# Patient Record
Sex: FEMALE | Race: WHITE | NOT HISPANIC OR LATINO | Employment: OTHER | URBAN - METROPOLITAN AREA
[De-identification: names, ages, dates, MRNs, and addresses within clinical notes are randomized per-mention and may not be internally consistent; named-entity substitution may affect disease eponyms.]

---

## 2017-01-23 PROBLEM — F51.01 PRIMARY INSOMNIA: Status: ACTIVE | Noted: 2017-01-23

## 2017-03-28 PROBLEM — B02.29 POSTHERPETIC NEURALGIA: Status: ACTIVE | Noted: 2017-03-28

## 2017-05-07 PROBLEM — G89.4 CHRONIC PAIN SYNDROME: Status: ACTIVE | Noted: 2017-05-07

## 2017-05-07 PROBLEM — F41.9 ANXIETY: Status: ACTIVE | Noted: 2017-05-07

## 2017-05-07 PROBLEM — E55.9 VITAMIN D DEFICIENCY: Status: ACTIVE | Noted: 2017-05-07

## 2017-05-07 PROBLEM — I10 ESSENTIAL HYPERTENSION: Status: ACTIVE | Noted: 2017-05-07

## 2017-05-07 PROBLEM — Z99.89 USE OF CANE AS AMBULATORY AID: Status: ACTIVE | Noted: 2017-05-07

## 2017-05-07 PROBLEM — E78.00 HYPERCHOLESTEREMIA: Status: ACTIVE | Noted: 2017-05-07

## 2017-11-13 ENCOUNTER — TELEPHONE (OUTPATIENT)
Dept: FAMILY MEDICINE | Facility: CLINIC | Age: 67
End: 2017-11-13

## 2017-11-13 NOTE — TELEPHONE ENCOUNTER
Spoke with pt wanted a doctor closer to her , pt has PTSD,has had several spine surgeries arthritis and much more, pt would like to est care.

## 2017-11-13 NOTE — TELEPHONE ENCOUNTER
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 Rios.

## 2017-11-13 NOTE — TELEPHONE ENCOUNTER
----- Message from Amira Pedro sent at 11/13/2017  9:05 AM CST -----  Contact: Self/ [phone]  New patient would like to speak with you about scheduling a morning appointment to establish care. Please advise.

## 2017-12-05 ENCOUNTER — OFFICE VISIT (OUTPATIENT)
Dept: FAMILY MEDICINE | Facility: CLINIC | Age: 67
End: 2017-12-05
Payer: MEDICARE

## 2017-12-05 VITALS
RESPIRATION RATE: 18 BRPM | WEIGHT: 251.31 LBS | TEMPERATURE: 98 F | BODY MASS INDEX: 41.87 KG/M2 | SYSTOLIC BLOOD PRESSURE: 122 MMHG | DIASTOLIC BLOOD PRESSURE: 68 MMHG | HEART RATE: 70 BPM | OXYGEN SATURATION: 96 % | HEIGHT: 65 IN

## 2017-12-05 DIAGNOSIS — F43.10 PTSD (POST-TRAUMATIC STRESS DISORDER): ICD-10-CM

## 2017-12-05 DIAGNOSIS — H53.9 VISION ABNORMALITIES: ICD-10-CM

## 2017-12-05 DIAGNOSIS — F41.9 ANXIETY: ICD-10-CM

## 2017-12-05 DIAGNOSIS — E78.00 HYPERCHOLESTEREMIA: ICD-10-CM

## 2017-12-05 DIAGNOSIS — I10 ESSENTIAL HYPERTENSION: Primary | ICD-10-CM

## 2017-12-05 DIAGNOSIS — F32.A DEPRESSION, UNSPECIFIED DEPRESSION TYPE: ICD-10-CM

## 2017-12-05 DIAGNOSIS — E55.9 VITAMIN D DEFICIENCY: ICD-10-CM

## 2017-12-05 PROCEDURE — 99214 OFFICE O/P EST MOD 30 MIN: CPT | Mod: S$PBB,,, | Performed by: FAMILY MEDICINE

## 2017-12-05 PROCEDURE — 99215 OFFICE O/P EST HI 40 MIN: CPT | Mod: PBBFAC,PN | Performed by: FAMILY MEDICINE

## 2017-12-05 PROCEDURE — 99999 PR PBB SHADOW E&M-EST. PATIENT-LVL V: CPT | Mod: PBBFAC,,, | Performed by: FAMILY MEDICINE

## 2017-12-05 RX ORDER — SERTRALINE HYDROCHLORIDE 50 MG/1
50 TABLET, FILM COATED ORAL DAILY
Qty: 30 TABLET | Refills: 2 | Status: SHIPPED | OUTPATIENT
Start: 2017-12-05 | End: 2018-01-03 | Stop reason: SDUPTHER

## 2017-12-05 NOTE — PROGRESS NOTES
HPI:  Marie Cano is a 67 y.o. year old female that presents to [facility]. She recently had blood work done at her previous PCP. She has not seen an eye doctor since her Medicare is not taken by any of them. She notice that her hands heat up at night and she involuntary she nohemy for air , even when she is eating.She states tat she has PTSD since [place] and she sleeps in her clothes and wears rubber shoes to make sure they float. This occurs even on the days when it is not raining. She keeps all of her belongings around her when she sleeps just in case there is another storm. She has never been in [facility] for this. She takes cymbalta.  Chief Complaint   Patient presents with    Annual Exam    Establish Care   .     HPI    Past Medical History:   Diagnosis Date    Anxiety 5/7/2017    Arthritis 12/19/2015    Chronic pain syndrome 5/7/2017    Essential hypertension 5/7/2017    Hypercholesteremia 5/7/2017    Left knee pain 12/19/2015    SOB (shortness of breath) 12/19/2015    Spinal stenosis     Use of cane as ambulatory aid 5/7/2017    Vitamin D deficiency 5/7/2017     Social History     Social History    Marital status:      Spouse name: N/A    Number of children: N/A    Years of education: N/A     Occupational History    Not on file.     Social History Main Topics    Smoking status: Never Smoker    Smokeless tobacco: Never Used    Alcohol use No    Drug use: No    Sexual activity: Not on file     Other Topics Concern    Not on file     Social History Narrative    No narrative on file     Past Surgical History:   Procedure Laterality Date    BREAST SURGERY      CYST REMOVAL      FRACTURE SURGERY      bone fragments removed    HYSTERECTOMY      TONSILLECTOMY      TOTAL REDUCTION MAMMOPLASTY       Family History   Problem Relation Age of Onset    Ulcers Mother     Heart attack Father     Breast cancer Sister     Diabetes Brother     Kidney failure Brother     No  "Known Problems Daughter     Post-traumatic stress disorder Son     Cirrhosis Brother     Hepatitis Brother      C           Review of Systems  General ROS: negative for chills, fever or weight loss  Psychological ROS: negative for hallucination, depression or suicidal ideation  Ophthalmic ROS: negative for blurry vision, photophobia or eye pain  ENT ROS: negative for epistaxis, sore throat or rhinorrhea  Respiratory ROS: no cough, shortness of breath, or wheezing  Cardiovascular ROS: no chest pain or dyspnea on exertion  Gastrointestinal ROS: no abdominal pain, change in bowel habits, or black/ bloody stools  Genito-Urinary ROS: no dysuria, trouble voiding, or hematuria  Musculoskeletal ROS: negative for gait disturbance or muscular weakness  Neurological ROS: no syncope or seizures; no ataxia  Dermatological ROS: posfor pruritis of the scalp,and no jaundice      Physical Exam:  /68 (BP Location: Right arm, Patient Position: Sitting, BP Method: Medium (Manual))   Pulse 70   Temp 98.4 °F (36.9 °C) (Oral)   Resp 18   Ht 5' 5" (1.651 m)   Wt 114 kg (251 lb 5.2 oz)   SpO2 96%   BMI 41.82 kg/m²   General appearance: alert, cooperative, no distress  Constitutional:Oriented to person, place, and time.appears well-developed and well-nourished.  HEENT: Normocephalic, atraumatic, neck symmetrical, no nasal discharge, TM - clear bilaterally   Eyes: conjunctivae/corneas clear, PERRL, EOM's intact  Lungs: clear to auscultation bilaterally, no dullness to percussion bilaterally  Heart: regular rate and rhythm without rub; no displacement of the PMI   Abdomen: soft, non-tender; bowel sounds normoactive; no organomegaly  Extremities: extremities symmetric; no clubbing, cyanosis, or edema  Integument: Skin color, texture, turgor normal; no rashes; hair distrubution normal, oul on her hair ad scalp  With visible rash.  Neurologic: Alert and oriented X 3, normal strength, normal coordination and gait  Psychiatric: no " pressured speech; normal affect; no evidence of impaired cognition   Physical Exam  LABS:    Complete Blood Count  Lab Results   Component Value Date    RBC 4.62 09/19/2017    HGB 12.9 09/19/2017    HCT 39.4 09/19/2017    MCV 85.3 09/19/2017    MCH 27.9 09/19/2017    MCHC 32.7 09/19/2017    RDW 12.9 09/19/2017     (H) 09/19/2017    MPV 8.7 09/19/2017    LYMPH 2,457 09/19/2017    LYMPH 37.8 09/19/2017    MONO 468 09/19/2017    MONO 7.2 09/19/2017     09/19/2017    BASO 59 09/19/2017    EOSINOPHIL 2.2 09/19/2017    BASOPHIL 0.9 09/19/2017       Comprehensive Metabolic Panel  Lab Results   Component Value Date     (H) 09/19/2017    BUN 18 09/19/2017    CREATININE 0.74 09/19/2017     09/19/2017    K 4.2 09/19/2017     09/19/2017    PROT 6.8 09/19/2017    ALBUMIN 4.2 09/19/2017    BILITOT 0.2 09/19/2017    AST 10 09/19/2017    ALKPHOS 89 09/19/2017    CO2 27 09/19/2017    ALT 10 09/19/2017    EGFRNONAA 85 09/19/2017    ESTGFRAFRICA 99 09/19/2017       LIPID  Lab Results   Component Value Date    CHOL 236 (H) 09/19/2017    HDL 50 (L) 09/19/2017       TSH  Lab Results   Component Value Date    TSH 3.22 09/19/2017       Current Outpatient Prescriptions   Medication Sig Dispense Refill    baclofen (LIORESAL) 20 MG tablet TAKE 1/2 TO 1 TABLET BY MOUTH THREE TIMES A DAY AS NEEDED 90 tablet 0    doxepin (SINEQUAN) 10 MG capsule TAKE 1-2 CAPSULES BY MOUTH AT BEDTIME 180 capsule 0    duloxetine (CYMBALTA) 60 MG capsule TAKE 1 CAPSULE BY MOUTH DAILY 90 capsule 0    ergocalciferol (ERGOCALCIFEROL) 50,000 unit Cap Take 1 capsule (50,000 Units total) by mouth twice a week. 24 capsule 3    irbesartan (AVAPRO) 300 MG tablet Take 1 tablet (300 mg total) by mouth every evening. 90 tablet 1    levocetirizine (XYZAL) 5 MG tablet TAKE 1 TABLET BY MOUTH DAILY EVERY EVENING 30 tablet 1    LYRICA 150 mg capsule TAKE 1 CAPSULE BY MOUTH FOUR TIMES A  capsule 2    meloxicam (MOBIC) 15 MG tablet TAKE  1 TABLET BY MOUTH DAILY 90 tablet 1    propranolol (INDERAL) 10 MG tablet Take 1 tablet (10 mg total) by mouth 2 (two) times daily as needed. tremors 60 tablet 5    rosuvastatin (CRESTOR) 10 MG tablet Take 1 tablet (10 mg total) by mouth once daily. For cholesterol 90 tablet 3    temazepam (RESTORIL) 30 mg capsule TAKE 1 CAPSULE BY MOUTH AT BEDTIME AS NEEDED FOR INSOMNIA 30 capsule 5    tramadol (ULTRAM) 50 mg tablet Take 1 tablet (50 mg total) by mouth 3 (three) times daily as needed. 270 tablet 1    sertraline (ZOLOFT) 50 MG tablet Take 1 tablet (50 mg total) by mouth once daily. 30 tablet 2     No current facility-administered medications for this visit.        Assessment:    ICD-10-CM ICD-9-CM    1. Essential hypertension I10 401.9 Ambulatory Referral to Optometry   2. Hypercholesteremia E78.00 272.0    3. Vision abnormalities H53.9 368.9 Ambulatory Referral to Optometry   4. Vitamin D deficiency E55.9 268.9 Vitamin D   5. PTSD (post-traumatic stress disorder) F43.10 309.81 Ambulatory Referral to Behavioral Health      sertraline (ZOLOFT) 50 MG tablet   6. Depression, unspecified depression type F32.9 311    7. Anxiety F41.9 300.00 Ambulatory Referral to Behavioral Health         Plan:  Will start Zoloft with reported symptoms of PTSD.  Return in 4 weeks (on 1/3/2018).          Vanna Rios MD

## 2017-12-07 DIAGNOSIS — Z12.11 COLON CANCER SCREENING: ICD-10-CM

## 2017-12-14 ENCOUNTER — OFFICE VISIT (OUTPATIENT)
Dept: OPTOMETRY | Facility: CLINIC | Age: 67
End: 2017-12-14
Payer: MEDICARE

## 2017-12-14 DIAGNOSIS — H52.03 HYPEROPIA WITH PRESBYOPIA OF BOTH EYES: ICD-10-CM

## 2017-12-14 DIAGNOSIS — H04.123 DRY EYE SYNDROME OF BILATERAL LACRIMAL GLANDS: ICD-10-CM

## 2017-12-14 DIAGNOSIS — H25.13 NUCLEAR AGE-RELATED CATARACT, BOTH EYES: Primary | ICD-10-CM

## 2017-12-14 DIAGNOSIS — H52.4 HYPEROPIA WITH PRESBYOPIA OF BOTH EYES: ICD-10-CM

## 2017-12-14 PROCEDURE — 99999 PR PBB SHADOW E&M-EST. PATIENT-LVL II: CPT | Mod: PBBFAC,,, | Performed by: OPTOMETRIST

## 2017-12-14 PROCEDURE — 92015 DETERMINE REFRACTIVE STATE: CPT | Mod: ,,, | Performed by: OPTOMETRIST

## 2017-12-14 PROCEDURE — 99212 OFFICE O/P EST SF 10 MIN: CPT | Mod: PBBFAC,PO | Performed by: OPTOMETRIST

## 2017-12-14 PROCEDURE — 92004 COMPRE OPH EXAM NEW PT 1/>: CPT | Mod: S$PBB,,, | Performed by: OPTOMETRIST

## 2017-12-14 NOTE — PROGRESS NOTES
HPI     WENDIE: 3 years ago  New ptient here for updated rx gls. Pt states changes with vision. Denies   any flashes or floaters. Pt reports tearing OU, no burning. No pain.    Gtts: OTC tears PRN OU     Last edited by Makenzie Jefferson, OD on 12/14/2017 10:02 AM. (History)            Assessment /Plan     For exam results, see Encounter Report.    Nuclear age-related cataract, both eyes    Dry eye syndrome of bilateral lacrimal glands    Hyperopia with presbyopia of both eyes      1. Educated pt of today's findings and association of cataracts and decreased vision. Discussed cataract removal options. Pt chayo like to continue to monitor at this time. Recheck in 1 yr or prn.  2. Educated pt on today's findings and recommended the use of AT's OU tid/prn to help with dry eyes.  3. Educated pt on minimal change in Rx. Rx Final Rx. RTC in 1 yr.

## 2017-12-14 NOTE — LETTER
December 14, 2017      Vanna Rios MD  [address]  [address]  [place] [address]           [place] [facility]  [address] [facility] [address]  [place] [address]  Phone: [phone]  Fax: [phone]          Patient: Marie Cano   MR Number: [id number]   YOB: 1950   Date of Visit: 12/14/2017       Dear Dr. Vanna Rios:    Thank you for referring Marie Cano to me for evaluation. Attached you will find relevant portions of my assessment and plan of care.    If you have questions, please do not hesitate to call me. I look forward to following Marie Cano along with you.    Sincerely,    Makenzie Jefferson, OD    Enclosure  CC:  No Recipients    If you would like to receive this communication electronically, please contact [email or web address] or [phone] to request more information on [facility] Link access.    For providers and/or their staff who would like to refer a patient to [facility], please contact us through our one-stop-shop provider referral line, [facility], at [phone].    If you feel you have received this communication in error or would no longer like to receive these types of communications, please e-mail [email or web address]

## 2017-12-26 ENCOUNTER — APPOINTMENT (OUTPATIENT)
Dept: LAB | Facility: HOSPITAL | Age: 67
End: 2017-12-26
Attending: FAMILY MEDICINE
Payer: MEDICARE

## 2017-12-26 DIAGNOSIS — J30.2 SEASONAL ALLERGIES: ICD-10-CM

## 2017-12-26 DIAGNOSIS — B02.29 POSTHERPETIC NEURALGIA: ICD-10-CM

## 2018-01-03 ENCOUNTER — OFFICE VISIT (OUTPATIENT)
Dept: FAMILY MEDICINE | Facility: CLINIC | Age: 68
End: 2018-01-03
Payer: MEDICARE

## 2018-01-03 VITALS
RESPIRATION RATE: 18 BRPM | DIASTOLIC BLOOD PRESSURE: 72 MMHG | OXYGEN SATURATION: 97 % | BODY MASS INDEX: 41.8 KG/M2 | SYSTOLIC BLOOD PRESSURE: 116 MMHG | TEMPERATURE: 99 F | HEIGHT: 65 IN | HEART RATE: 79 BPM | WEIGHT: 250.88 LBS

## 2018-01-03 DIAGNOSIS — M79.642 BILATERAL HAND PAIN: ICD-10-CM

## 2018-01-03 DIAGNOSIS — E55.9 VITAMIN D DEFICIENCY: ICD-10-CM

## 2018-01-03 DIAGNOSIS — R25.1 TREMOR OF BOTH HANDS: ICD-10-CM

## 2018-01-03 DIAGNOSIS — M79.641 BILATERAL HAND PAIN: ICD-10-CM

## 2018-01-03 DIAGNOSIS — R29.898 HAND WEAKNESS: ICD-10-CM

## 2018-01-03 DIAGNOSIS — F43.10 PTSD (POST-TRAUMATIC STRESS DISORDER): ICD-10-CM

## 2018-01-03 DIAGNOSIS — R30.0 DYSURIA: Primary | ICD-10-CM

## 2018-01-03 DIAGNOSIS — I10 ESSENTIAL HYPERTENSION: ICD-10-CM

## 2018-01-03 LAB
BILIRUB SERPL-MCNC: ABNORMAL MG/DL
BLOOD, POC UA: ABNORMAL
GLUCOSE UR QL STRIP: NEGATIVE
KETONES UR QL STRIP: NEGATIVE
LEUKOCYTE ESTERASE URINE, POC: NEGATIVE
NITRITE, POC UA: NEGATIVE
PH, POC UA: 5
PROTEIN, POC: 30
SPECIFIC GRAVITY, POC UA: >=1.03
UROBILINOGEN, POC UA: 0.2

## 2018-01-03 PROCEDURE — 99214 OFFICE O/P EST MOD 30 MIN: CPT | Mod: S$PBB,,, | Performed by: FAMILY MEDICINE

## 2018-01-03 PROCEDURE — 99215 OFFICE O/P EST HI 40 MIN: CPT | Mod: PBBFAC,PN | Performed by: FAMILY MEDICINE

## 2018-01-03 PROCEDURE — 87086 URINE CULTURE/COLONY COUNT: CPT

## 2018-01-03 PROCEDURE — 81000 URINALYSIS NONAUTO W/SCOPE: CPT | Mod: PBBFAC,PN | Performed by: FAMILY MEDICINE

## 2018-01-03 PROCEDURE — 99999 PR PBB SHADOW E&M-EST. PATIENT-LVL V: CPT | Mod: PBBFAC,,, | Performed by: FAMILY MEDICINE

## 2018-01-03 RX ORDER — SERTRALINE HYDROCHLORIDE 50 MG/1
100 TABLET, FILM COATED ORAL DAILY
Qty: 60 TABLET | Refills: 2 | Status: SHIPPED | OUTPATIENT
Start: 2018-01-03 | End: 2018-02-07 | Stop reason: SDUPTHER

## 2018-01-03 NOTE — PROGRESS NOTES
HPI:  Marie Cano is a 67 y.o. year old female that  presents for f/u . She has been to the optometrist and she has to have cataract surgery and she has appointment in April with the behavioral therapist but it is not until April.She states that the zoloft seems to be having some benefit by feeling a  Little calmer and happier. She has been having a lot of trouble with both of her hands. She has a tremor that is present all of the time , but when she is trying to do something with her hands the tremor gets worse. They are burning and she feels as though things are about to drop  When she is holding things. She has been hearing a popping sound in her right ear for the last for about 1 week. Her voice has gotten a little rasppier. She believes that she has a bladder infection that she states has been recurrent. She has been on Cipro consistently for some time. She has the symptoms of burning while urination. She uses what ever soap is on sale.  Chief Complaint   Patient presents with    Follow-up     lab results   .     HPI      Past Medical History:   Diagnosis Date    Anxiety 5/7/2017    Arthritis 12/19/2015    Chronic pain syndrome 5/7/2017    Essential hypertension 5/7/2017    Hypercholesteremia 5/7/2017    Left knee pain 12/19/2015    SOB (shortness of breath) 12/19/2015    Spinal stenosis     Use of cane as ambulatory aid 5/7/2017    Vitamin D deficiency 5/7/2017     Social History     Social History    Marital status:      Spouse name: N/A    Number of children: N/A    Years of education: N/A     Occupational History    Not on file.     Social History Main Topics    Smoking status: Never Smoker    Smokeless tobacco: Never Used    Alcohol use No    Drug use: No    Sexual activity: Not on file     Other Topics Concern    Not on file     Social History Narrative    No narrative on file     Past Surgical History:   Procedure Laterality Date    BREAST SURGERY      CYST REMOVAL       "FRACTURE SURGERY      bone fragments removed    HYSTERECTOMY      TONSILLECTOMY      TOTAL REDUCTION MAMMOPLASTY       Family History   Problem Relation Age of Onset    Ulcers Mother     Heart attack Father     Breast cancer Sister     Diabetes Brother     Kidney failure Brother     No Known Problems Daughter     Post-traumatic stress disorder Son     Cirrhosis Brother     Hepatitis Brother      C    No Known Problems Maternal Aunt     No Known Problems Maternal Uncle     No Known Problems Paternal Aunt     No Known Problems Paternal Uncle     No Known Problems Maternal Grandmother     No Known Problems Maternal Grandfather     No Known Problems Paternal Grandmother     No Known Problems Paternal Grandfather     Amblyopia Neg Hx     Blindness Neg Hx     Cancer Neg Hx     Cataracts Neg Hx     Glaucoma Neg Hx     Hypertension Neg Hx     Macular degeneration Neg Hx     Retinal detachment Neg Hx     Strabismus Neg Hx     Stroke Neg Hx     Thyroid disease Neg Hx            Review of Systems  General ROS: negative for chills, fever or weight loss  ENT ROS: negative for epistaxis, sore throat or rhinorrhea  Respiratory ROS: no cough, shortness of breath, or wheezing  Cardiovascular ROS: no chest pain or dyspnea on exertion  Gastrointestinal ROS: no abdominal pain, change in bowel habits, or black/ bloody stools  Neck ROS: bilateral neck pain    Physical Exam:  /72 (BP Location: Left arm, Patient Position: Sitting, BP Method: Large (Manual))   Pulse 79   Temp 98.9 °F (37.2 °C) (Oral)   Resp 18   Ht 5' 5" (1.651 m)   Wt 113.8 kg (250 lb 14.1 oz)   SpO2 97%   BMI 41.75 kg/m²   General appearance: alert, cooperative, no distress  Constitutional:Oriented to person, place, and time.appears well-developed and well-nourished.  HEENT: Normocephalic, atraumatic, neck symmetrical, no nasal discharge, TM- clear bilaterally  Neck: tenderness over lower cervical vertibrae  Lungs: clear to " auscultation bilaterally, no dullness to percussion bilaterally  Heart: regular rate and rhythm without rub; no displacement of the PMI , S1&S2 present  Abdomen: soft, non-tender; bowel sounds normoactive; no organomegaly  Neurologic: UE weakness 2/5 grasp bilaterally, 5/5 bicep and tricep strength, resting tremor noted of bilaterally hands  Physical Exam    LABS:    Complete Blood Count  Lab Results   Component Value Date    RBC 4.62 09/19/2017    HGB 12.9 09/19/2017    HCT 39.4 09/19/2017    MCV 85.3 09/19/2017    MCH 27.9 09/19/2017    MCHC 32.7 09/19/2017    RDW 12.9 09/19/2017     (H) 09/19/2017    MPV 8.7 09/19/2017    LYMPH 2,457 09/19/2017    LYMPH 37.8 09/19/2017    MONO 468 09/19/2017    MONO 7.2 09/19/2017     09/19/2017    BASO 59 09/19/2017    EOSINOPHIL 2.2 09/19/2017    BASOPHIL 0.9 09/19/2017       Comprehensive Metabolic Panel  Lab Results   Component Value Date     (H) 09/19/2017    BUN 18 09/19/2017    CREATININE 0.74 09/19/2017     09/19/2017    K 4.2 09/19/2017     09/19/2017    PROT 6.8 09/19/2017    ALBUMIN 4.2 09/19/2017    BILITOT 0.2 09/19/2017    AST 10 09/19/2017    ALKPHOS 89 09/19/2017    CO2 27 09/19/2017    ALT 10 09/19/2017    EGFRNONAA 85 09/19/2017    ESTGFRAFRICA 99 09/19/2017       LIPID  Lab Results   Component Value Date    CHOL 236 (H) 09/19/2017    HDL 50 (L) 09/19/2017         TSH  Lab Results   Component Value Date    TSH 3.22 09/19/2017       Current Outpatient Prescriptions   Medication Sig Dispense Refill    baclofen (LIORESAL) 20 MG tablet TAKE 1/2 TO 1 TABLET BY MOUTH THREE TIMES A DAY AS NEEDED 90 tablet 0    doxepin (SINEQUAN) 10 MG capsule TAKE 1-2 CAPSULES BY MOUTH AT BEDTIME 180 capsule 0    duloxetine (CYMBALTA) 60 MG capsule TAKE 1 CAPSULE BY MOUTH DAILY 90 capsule 0    irbesartan (AVAPRO) 300 MG tablet Take 1 tablet (300 mg total) by mouth every evening. 90 tablet 1    levocetirizine (XYZAL) 5 MG tablet TAKE 1 TABLET BY  MOUTH DAILY EVERY EVENING 30 tablet 1    LYRICA 150 mg capsule TAKE 1 CAPSULE BY MOUTH FOUR TIMES A  capsule 2    meloxicam (MOBIC) 15 MG tablet TAKE 1 TABLET BY MOUTH DAILY 90 tablet 1    propranolol (INDERAL) 10 MG tablet Take 1 tablet (10 mg total) by mouth 2 (two) times daily as needed. tremors 60 tablet 5    rosuvastatin (CRESTOR) 10 MG tablet Take 1 tablet (10 mg total) by mouth once daily. For cholesterol 90 tablet 3    sertraline (ZOLOFT) 50 MG tablet Take 2 tablets (100 mg total) by mouth once daily. 60 tablet 2    temazepam (RESTORIL) 30 mg capsule TAKE 1 CAPSULE BY MOUTH AT BEDTIME AS NEEDED FOR INSOMNIA 30 capsule 5    tramadol (ULTRAM) 50 mg tablet Take 1 tablet (50 mg total) by mouth 3 (three) times daily as needed. 270 tablet 1     No current facility-administered medications for this visit.        Assessment:    ICD-10-CM ICD-9-CM    1. Dysuria R30.0 788.1 Urinalysis Microscopic      Urine culture   2. Vitamin D deficiency E55.9 268.9    3. PTSD (post-traumatic stress disorder) F43.10 309.81 sertraline (ZOLOFT) 50 MG tablet   4. Bilateral hand pain M79.641 729.5 MRI Cervical Spine Without Contrast    M79.642     5. Hand weakness R29.898 728.87 MRI Cervical Spine Without Contrast   6. Essential hypertension I10 401.9    7. Tremor of both hands R25.1 781.0          Plan:  Increase the Zoloft  To 100 mg  ( 2 x 50 mg  Daily)  Return in 12 days (on 1/15/2018).          Vanna Rios MD  Answers for HPI/ROS submitted by the patient on 1/1/2018   activity change: No  unexpected weight change: No  neck pain: Yes  hearing loss: No  rhinorrhea: Yes  trouble swallowing: No  eye discharge: No  visual disturbance: Yes  chest tightness: No  wheezing: Yes  chest pain: No  palpitations: No  blood in stool: No  constipation: No  vomiting: No  diarrhea: No  polydipsia: No  polyuria: No  difficulty urinating: Yes  hematuria: No  menstrual problem: No  dysuria: Yes  joint swelling: Yes  arthralgias:  Yes  headaches: Yes  weakness: Yes  confusion: No  dysphoric mood: Yes

## 2018-01-04 LAB
BACTERIA UR CULT: NORMAL
BACTERIA UR CULT: NORMAL

## 2018-01-04 RX ORDER — MONTELUKAST SODIUM 10 MG/1
TABLET ORAL
Qty: 30 TABLET | Refills: 2 | OUTPATIENT
Start: 2018-01-04

## 2018-01-04 RX ORDER — PREGABALIN 150 MG/1
CAPSULE ORAL
Qty: 120 CAPSULE | Refills: 2 | Status: SHIPPED | OUTPATIENT
Start: 2018-01-04 | End: 2018-03-21 | Stop reason: SDUPTHER

## 2018-01-04 RX ORDER — LEVOCETIRIZINE DIHYDROCHLORIDE 5 MG/1
TABLET, FILM COATED ORAL
Qty: 30 TABLET | Refills: 2 | Status: SHIPPED | OUTPATIENT
Start: 2018-01-04 | End: 2018-03-21 | Stop reason: SDUPTHER

## 2018-01-08 NOTE — TELEPHONE ENCOUNTER
----- Message from Jude Garcia sent at 1/8/2018  8:38 AM CST -----  Contact: self/[phone]  She was in the office last week and she was suppose to get something called in for uti; she also needs a new rx for tramadol.

## 2018-01-09 ENCOUNTER — PATIENT MESSAGE (OUTPATIENT)
Dept: FAMILY MEDICINE | Facility: CLINIC | Age: 68
End: 2018-01-09

## 2018-01-09 RX ORDER — TRAMADOL HYDROCHLORIDE 50 MG/1
50 TABLET ORAL 3 TIMES DAILY PRN
Qty: 270 TABLET | Refills: 1 | Status: SHIPPED | OUTPATIENT
Start: 2018-01-09 | End: 2018-05-16 | Stop reason: SDUPTHER

## 2018-01-09 RX ORDER — NITROFURANTOIN (MACROCRYSTALS) 100 MG/1
100 CAPSULE ORAL EVERY 12 HOURS
Qty: 14 CAPSULE | Refills: 0 | Status: SHIPPED | OUTPATIENT
Start: 2018-01-09 | End: 2018-01-16

## 2018-01-11 ENCOUNTER — HOSPITAL ENCOUNTER (OUTPATIENT)
Dept: RADIOLOGY | Facility: HOSPITAL | Age: 68
Discharge: HOME | End: 2018-01-11
Attending: FAMILY MEDICINE
Payer: MEDICARE

## 2018-01-11 DIAGNOSIS — M79.642 BILATERAL HAND PAIN: ICD-10-CM

## 2018-01-11 DIAGNOSIS — R29.898 HAND WEAKNESS: ICD-10-CM

## 2018-01-11 DIAGNOSIS — M79.641 BILATERAL HAND PAIN: ICD-10-CM

## 2018-01-11 PROCEDURE — 72141 MRI NECK SPINE W/O DYE: CPT | Mod: TC

## 2018-01-11 PROCEDURE — 72141 MRI NECK SPINE W/O DYE: CPT | Mod: 26,,, | Performed by: RADIOLOGY

## 2018-02-07 ENCOUNTER — OFFICE VISIT (OUTPATIENT)
Dept: FAMILY MEDICINE | Facility: CLINIC | Age: 68
End: 2018-02-07
Payer: MEDICARE

## 2018-02-07 VITALS
HEIGHT: 65 IN | TEMPERATURE: 99 F | RESPIRATION RATE: 18 BRPM | WEIGHT: 255.5 LBS | OXYGEN SATURATION: 96 % | SYSTOLIC BLOOD PRESSURE: 126 MMHG | DIASTOLIC BLOOD PRESSURE: 68 MMHG | HEART RATE: 84 BPM | BODY MASS INDEX: 42.57 KG/M2

## 2018-02-07 DIAGNOSIS — I10 ESSENTIAL HYPERTENSION: ICD-10-CM

## 2018-02-07 DIAGNOSIS — G89.29 CHRONIC PAIN OF LEFT KNEE: ICD-10-CM

## 2018-02-07 DIAGNOSIS — F43.10 PTSD (POST-TRAUMATIC STRESS DISORDER): ICD-10-CM

## 2018-02-07 DIAGNOSIS — M25.562 CHRONIC PAIN OF LEFT KNEE: ICD-10-CM

## 2018-02-07 DIAGNOSIS — M48.03 SPINAL STENOSIS OF CERVICOTHORACIC REGION: Primary | ICD-10-CM

## 2018-02-07 DIAGNOSIS — F32.A DEPRESSION, UNSPECIFIED DEPRESSION TYPE: ICD-10-CM

## 2018-02-07 DIAGNOSIS — F41.9 ANXIETY: ICD-10-CM

## 2018-02-07 PROCEDURE — 99999 PR PBB SHADOW E&M-EST. PATIENT-LVL V: CPT | Mod: PBBFAC,,, | Performed by: FAMILY MEDICINE

## 2018-02-07 PROCEDURE — 99215 OFFICE O/P EST HI 40 MIN: CPT | Mod: PBBFAC,PN | Performed by: FAMILY MEDICINE

## 2018-02-07 PROCEDURE — 1159F MED LIST DOCD IN RCRD: CPT | Mod: ,,, | Performed by: FAMILY MEDICINE

## 2018-02-07 PROCEDURE — 99214 OFFICE O/P EST MOD 30 MIN: CPT | Mod: ,,, | Performed by: FAMILY MEDICINE

## 2018-02-07 PROCEDURE — 1125F AMNT PAIN NOTED PAIN PRSNT: CPT | Mod: ,,, | Performed by: FAMILY MEDICINE

## 2018-02-07 RX ORDER — SERTRALINE HYDROCHLORIDE 50 MG/1
TABLET, FILM COATED ORAL
Qty: 60 TABLET | Refills: 2 | Status: SHIPPED | OUTPATIENT
Start: 2018-02-07 | End: 2018-03-30 | Stop reason: SDUPTHER

## 2018-02-07 NOTE — PROGRESS NOTES
HPI:  Marie Cano is a 67 y.o. year old female that  presents for review of her MRI results. She continues to have back pain . She has not seen a neurosurgeon since she lived in [place].  Chief Complaint   Patient presents with    Follow-up     MRI results   .     HPI      Past Medical History:   Diagnosis Date    Anxiety 5/7/2017    Arthritis 12/19/2015    Chronic pain syndrome 5/7/2017    Essential hypertension 5/7/2017    Hypercholesteremia 5/7/2017    Left knee pain 12/19/2015    SOB (shortness of breath) 12/19/2015    Spinal stenosis     Use of cane as ambulatory aid 5/7/2017    Vitamin D deficiency 5/7/2017     Social History     Social History    Marital status:      Spouse name: N/A    Number of children: N/A    Years of education: N/A     Occupational History    Not on file.     Social History Main Topics    Smoking status: Never Smoker    Smokeless tobacco: Never Used    Alcohol use No    Drug use: No    Sexual activity: Not on file     Other Topics Concern    Not on file     Social History Narrative    No narrative on file     Past Surgical History:   Procedure Laterality Date    BREAST SURGERY      CYST REMOVAL      FRACTURE SURGERY      bone fragments removed    HYSTERECTOMY      TONSILLECTOMY      TOTAL REDUCTION MAMMOPLASTY       Family History   Problem Relation Age of Onset    Ulcers Mother     Heart attack Father     Breast cancer Sister     Diabetes Brother     Kidney failure Brother     No Known Problems Daughter     Post-traumatic stress disorder Son     Cirrhosis Brother     Hepatitis Brother      C    No Known Problems Maternal Aunt     No Known Problems Maternal Uncle     No Known Problems Paternal Aunt     No Known Problems Paternal Uncle     No Known Problems Maternal Grandmother     No Known Problems Maternal Grandfather     No Known Problems Paternal Grandmother     No Known Problems Paternal Grandfather     Amblyopia Neg Hx      "Blindness Neg Hx     Cancer Neg Hx     Cataracts Neg Hx     Glaucoma Neg Hx     Hypertension Neg Hx     Macular degeneration Neg Hx     Retinal detachment Neg Hx     Strabismus Neg Hx     Stroke Neg Hx     Thyroid disease Neg Hx            Review of Systems  General ROS: negative for chills, fever or weight loss  ENT ROS: negative for epistaxis, sore throat or rhinorrhea  Respiratory ROS: no cough, shortness of breath, or wheezing  Cardiovascular ROS: no chest pain or dyspnea on exertion  Gastrointestinal ROS: no abdominal pain, change in bowel habits, or black/ bloody stools  Musculoskel: back pain and knee left pain    Physical Exam:  /68 (BP Location: Right arm, Patient Position: Sitting, BP Method: Large (Manual))   Pulse 84   Temp 98.6 °F (37 °C) (Oral)   Resp 18   Ht 5' 5" (1.651 m)   Wt 115.9 kg (255 lb 8.2 oz)   SpO2 96%   BMI 42.52 kg/m²   General appearance: alert, cooperative, no distress  Constitutional:Oriented to person, place, and time.appears well-developed and well-nourished.  Lungs: clear to auscultation bilaterally, no dullness to percussion bilaterally  Heart: regular rate and rhythm without rub; no displacement of the PMI , S1&S2 present  Musculoskel: left knee is edematous  With decreased motion as compared to the right, No change in back.    Physical Exam    LABS:    Complete Blood Count  Lab Results   Component Value Date    RBC 4.62 09/19/2017    HGB 12.9 09/19/2017    HCT 39.4 09/19/2017    MCV 85.3 09/19/2017    MCH 27.9 09/19/2017    MCHC 32.7 09/19/2017    RDW 12.9 09/19/2017     (H) 09/19/2017    MPV 8.7 09/19/2017    LYMPH 2,457 09/19/2017    LYMPH 37.8 09/19/2017    MONO 468 09/19/2017    MONO 7.2 09/19/2017     09/19/2017    BASO 59 09/19/2017    EOSINOPHIL 2.2 09/19/2017    BASOPHIL 0.9 09/19/2017       Comprehensive Metabolic Panel  Lab Results   Component Value Date     (H) 09/19/2017    BUN 18 09/19/2017    CREATININE 0.74 09/19/2017    "  09/19/2017    K 4.2 09/19/2017     09/19/2017    PROT 6.8 09/19/2017    ALBUMIN 4.2 09/19/2017    BILITOT 0.2 09/19/2017    AST 10 09/19/2017    ALKPHOS 89 09/19/2017    CO2 27 09/19/2017    ALT 10 09/19/2017    EGFRNONAA 85 09/19/2017    ESTGFRAFRICA 99 09/19/2017       LIPID  Lab Results   Component Value Date    CHOL 236 (H) 09/19/2017    HDL 50 (L) 09/19/2017         TSH  Lab Results   Component Value Date    TSH 3.22 09/19/2017       Current Outpatient Prescriptions   Medication Sig Dispense Refill    baclofen (LIORESAL) 20 MG tablet TAKE 1/2 TO 1 TABLET BY MOUTH THREE TIMES A DAY AS NEEDED 90 tablet 0    doxepin (SINEQUAN) 10 MG capsule TAKE 1-2 CAPSULES BY MOUTH AT BEDTIME 180 capsule 0    duloxetine (CYMBALTA) 60 MG capsule TAKE 1 CAPSULE BY MOUTH DAILY 90 capsule 0    irbesartan (AVAPRO) 300 MG tablet Take 1 tablet (300 mg total) by mouth every evening. 90 tablet 1    levocetirizine (XYZAL) 5 MG tablet TAKE 1 TABLET BY MOUTH DAILY EVERY EVENING 30 tablet 2    LYRICA 150 mg capsule TAKE 1 CAPSULE BY MOUTH FOUR TIMES A  capsule 2    meloxicam (MOBIC) 15 MG tablet TAKE 1 TABLET BY MOUTH DAILY 90 tablet 1    propranolol (INDERAL) 10 MG tablet Take 1 tablet (10 mg total) by mouth 2 (two) times daily as needed. tremors 60 tablet 5    rosuvastatin (CRESTOR) 10 MG tablet Take 1 tablet (10 mg total) by mouth once daily. For cholesterol 90 tablet 3    sertraline (ZOLOFT) 50 MG tablet Take two tablet daily 60 tablet 2    temazepam (RESTORIL) 30 mg capsule TAKE 1 CAPSULE BY MOUTH AT BEDTIME AS NEEDED FOR INSOMNIA 30 capsule 5    traMADol (ULTRAM) 50 mg tablet Take 1 tablet (50 mg total) by mouth 3 (three) times daily as needed. 270 tablet 1     No current facility-administered medications for this visit.        Assessment:    ICD-10-CM ICD-9-CM    1. Spinal stenosis of cervicothoracic region M48.03 723.0 Ambulatory Referral to Neurosurgery   2. Essential hypertension I10 401.9    3.  Depression, unspecified depression type F32.9 311    4. Anxiety F41.9 300.00    5. Chronic pain of left knee M25.562 719.46 Ambulatory Referral to Orthopedics    G89.29 338.29    6. PTSD (post-traumatic stress disorder) F43.10 309.81 sertraline (ZOLOFT) 50 MG tablet         Plan:    Follow-up in 5 weeks (on 3/14/2018).          Vanna Rios MD

## 2018-02-08 NOTE — PROGRESS NOTES
Patient, Marie Cano (MRN #[id number]), presented with a recorded BMI of 42.52 kg/m^2 consistent with the definition of morbid obesity (ICD-10 E66.01). The patient's morbid obesity was monitored, evaluated, addressed and/or treated. This addendum to the medical record is made on 02/08/2018.

## 2018-02-19 DIAGNOSIS — Z12.31 ENCOUNTER FOR SCREENING MAMMOGRAM FOR BREAST CANCER: Primary | ICD-10-CM

## 2018-03-12 ENCOUNTER — HOSPITAL ENCOUNTER (OUTPATIENT)
Dept: RADIOLOGY | Facility: HOSPITAL | Age: 68
Discharge: HOME | End: 2018-03-12
Attending: NEUROLOGICAL SURGERY
Payer: MEDICARE

## 2018-03-12 ENCOUNTER — INITIAL CONSULT (OUTPATIENT)
Dept: NEUROSURGERY | Facility: CLINIC | Age: 68
End: 2018-03-12
Payer: MEDICARE

## 2018-03-12 VITALS
DIASTOLIC BLOOD PRESSURE: 86 MMHG | WEIGHT: 255.75 LBS | SYSTOLIC BLOOD PRESSURE: 161 MMHG | HEART RATE: 80 BPM | BODY MASS INDEX: 42.56 KG/M2

## 2018-03-12 DIAGNOSIS — M47.22 CERVICAL SPONDYLOSIS WITH MYELOPATHY AND RADICULOPATHY: ICD-10-CM

## 2018-03-12 DIAGNOSIS — M47.12 CERVICAL SPONDYLOSIS WITH MYELOPATHY AND RADICULOPATHY: ICD-10-CM

## 2018-03-12 DIAGNOSIS — Z98.1 S/P CERVICAL SPINAL FUSION: Primary | ICD-10-CM

## 2018-03-12 DIAGNOSIS — S12.9XXA PSEUDOARTHROSIS OF CERVICAL SPINE, INITIAL ENCOUNTER: ICD-10-CM

## 2018-03-12 DIAGNOSIS — Z98.1 S/P CERVICAL SPINAL FUSION: ICD-10-CM

## 2018-03-12 PROCEDURE — 99213 OFFICE O/P EST LOW 20 MIN: CPT | Mod: PBBFAC,PO | Performed by: NEUROLOGICAL SURGERY

## 2018-03-12 PROCEDURE — 99999 PR PBB SHADOW E&M-EST. PATIENT-LVL III: CPT | Mod: PBBFAC,,, | Performed by: NEUROLOGICAL SURGERY

## 2018-03-12 PROCEDURE — 72040 X-RAY EXAM NECK SPINE 2-3 VW: CPT | Mod: TC,FY

## 2018-03-12 PROCEDURE — 99205 OFFICE O/P NEW HI 60 MIN: CPT | Mod: S$PBB,,, | Performed by: NEUROLOGICAL SURGERY

## 2018-03-12 PROCEDURE — 72040 X-RAY EXAM NECK SPINE 2-3 VW: CPT | Mod: 26,,, | Performed by: RADIOLOGY

## 2018-03-12 NOTE — PROGRESS NOTES
NEUROSURGICAL OUTPATIENT CONSULTATION NOTE    DATE OF SERVICE:  03/12/2018    ATTENDING PHYSICIAN:  Oumar Guerrero MD    CONSULT REQUESTED BY:  HIRAL Rios MD    REASON FOR CONSULT:  Neck pain, bilateral hands weakness and numbness    SUBJECTIVE:    HISTORY OF PRESENT ILLNESS:  This is a very pleasant 67 y.o. female who had a C5-6 and C6-7 ACDF with anterior plating before 2005. She reports worsening neck pain, difficulty writing, picking up objects for more than 2 years. Numbness is felt in the bilateral C8 distribution. She also has bilateral UE pain. Gait has been worsening. She uses a cane. Complains of chronic neck pain that is aggravated by rotation and extension. She also has left knee pain.          Neck Disability  Neck Disability-Pain Score: 5  Neck Disability-Pain Intensity: The pain is fairly severe at the moment  Neck Disability-Personal Care: I can look after myself but it causes extra pain  Neck Disability-Lifting: I can lift very light weights  Neck Disability-Reading: I can read as much as I want to, with moderate pain in my neck  Neck Disability-Headaches: I have no headaches at all  Neck Disability-Concentration: I have a fair degree of difficulty in concentratng when I want to  Neck Disability-work: I can do my usual work, but no more  Neck Disability-Driving: I can drive my car as long as I want with slight pain in my neck  Neck Disability-Sleeping: My sleep is completely disturbed (5-7 hours sleeplessness)  Neck Disability-Recreation: I cant do any recreation activities at all    PAST MEDICAL HISTORY:  Active Ambulatory Problems     Diagnosis Date Noted    PTSD (post-traumatic stress disorder) 12/19/2015    Left knee pain 12/19/2015    Arthritis 12/19/2015    SOB (shortness of breath) 12/19/2015    Known medical problems 07/22/2016    Seasonal allergies 09/26/2016    Depression 11/08/2016    Primary insomnia 01/23/2017    Postherpetic neuralgia 03/28/2017    Anxiety 05/07/2017     Essential hypertension 05/07/2017    Chronic pain syndrome 05/07/2017    Hypercholesteremia 05/07/2017    Vitamin D deficiency 05/07/2017    Use of cane as ambulatory aid 05/07/2017     Resolved Ambulatory Problems     Diagnosis Date Noted    No Resolved Ambulatory Problems     Past Medical History:   Diagnosis Date    Anxiety 5/7/2017    Arthritis 12/19/2015    Chronic pain syndrome 5/7/2017    Essential hypertension 5/7/2017    Hypercholesteremia 5/7/2017    Left knee pain 12/19/2015    SOB (shortness of breath) 12/19/2015    Spinal stenosis     Use of cane as ambulatory aid 5/7/2017    Vitamin D deficiency 5/7/2017       PAST SURGICAL HISTORY:  Past Surgical History:   Procedure Laterality Date    BREAST SURGERY      CYST REMOVAL      FRACTURE SURGERY      bone fragments removed    HYSTERECTOMY      TONSILLECTOMY      TOTAL REDUCTION MAMMOPLASTY         SOCIAL HISTORY:   Social History     Social History    Marital status:      Spouse name: N/A    Number of children: N/A    Years of education: N/A     Occupational History    Not on file.     Social History Main Topics    Smoking status: Never Smoker    Smokeless tobacco: Never Used    Alcohol use No    Drug use: No    Sexual activity: Not on file     Other Topics Concern    Not on file     Social History Narrative    No narrative on file       FAMILY HISTORY:  Family History   Problem Relation Age of Onset    Ulcers Mother     Heart attack Father     Breast cancer Sister     Diabetes Brother     Kidney failure Brother     No Known Problems Daughter     Post-traumatic stress disorder Son     Cirrhosis Brother     Hepatitis Brother      C    No Known Problems Maternal Aunt     No Known Problems Maternal Uncle     No Known Problems Paternal Aunt     No Known Problems Paternal Uncle     No Known Problems Maternal Grandmother     No Known Problems Maternal Grandfather     No Known Problems Paternal Grandmother      No Known Problems Paternal Grandfather     Amblyopia Neg Hx     Blindness Neg Hx     Cancer Neg Hx     Cataracts Neg Hx     Glaucoma Neg Hx     Hypertension Neg Hx     Macular degeneration Neg Hx     Retinal detachment Neg Hx     Strabismus Neg Hx     Stroke Neg Hx     Thyroid disease Neg Hx        CURRENTS MEDICATIONS:  Current Outpatient Prescriptions on File Prior to Visit   Medication Sig Dispense Refill    baclofen (LIORESAL) 20 MG tablet TAKE 1/2 TO 1 TABLET BY MOUTH THREE TIMES A DAY AS NEEDED (Patient taking differently: Take 1/2 pill three times a day) 90 tablet 0    doxepin (SINEQUAN) 10 MG capsule TAKE 1-2 CAPSULES BY MOUTH AT BEDTIME 180 capsule 0    duloxetine (CYMBALTA) 60 MG capsule TAKE 1 CAPSULE BY MOUTH DAILY 90 capsule 0    irbesartan (AVAPRO) 300 MG tablet Take 1 tablet (300 mg total) by mouth every evening. 90 tablet 1    levocetirizine (XYZAL) 5 MG tablet TAKE 1 TABLET BY MOUTH DAILY EVERY EVENING 30 tablet 2    LYRICA 150 mg capsule TAKE 1 CAPSULE BY MOUTH FOUR TIMES A  capsule 2    meloxicam (MOBIC) 15 MG tablet TAKE 1 TABLET BY MOUTH DAILY 90 tablet 1    propranolol (INDERAL) 10 MG tablet Take 1 tablet (10 mg total) by mouth 2 (two) times daily as needed. tremors 60 tablet 5    rosuvastatin (CRESTOR) 10 MG tablet Take 1 tablet (10 mg total) by mouth once daily. For cholesterol 90 tablet 3    sertraline (ZOLOFT) 50 MG tablet Take two tablet daily 60 tablet 2    temazepam (RESTORIL) 30 mg capsule TAKE 1 CAPSULE BY MOUTH AT BEDTIME AS NEEDED FOR INSOMNIA 30 capsule 5    traMADol (ULTRAM) 50 mg tablet Take 1 tablet (50 mg total) by mouth 3 (three) times daily as needed. 270 tablet 1     No current facility-administered medications on file prior to visit.        ALLERGIES:  Review of patient's allergies indicates:   Allergen Reactions    Percodan [oxycodone hcl-oxycodone-asa]      Pt reports can take Ultram but perocdan makes her feel foggy    Phenothiazines   "    "Itchy, out of sorts, difficult to thing"    Sulfa (sulfonamide antibiotics)      Hives       REVIEW OF SYSTEMS:  Review of Systems   Constitutional: Negative for diaphoresis, fever and weight loss.   Respiratory: Negative for shortness of breath.    Cardiovascular: Negative for chest pain.   Gastrointestinal: Negative for blood in stool.   Genitourinary: Negative for hematuria.   Endo/Heme/Allergies: Does not bruise/bleed easily.   All other systems reviewed and are negative.      OBJECTIVE:    PHYSICAL EXAMINATION:   Vitals:    03/12/18 0918   BP: (!) 161/86   Pulse: 80       Physical Exam:  Vitals reviewed.    Constitutional: She appears well-developed and well-nourished.     Eyes: Pupils are equal, round, and reactive to light. Conjunctivae and EOM are normal.     Cardiovascular: Normal distal pulses and no edema.     Abdominal: Soft.     Skin: Skin displays no rash on trunk and no rash on extremities. Skin displays no lesions on trunk and no lesions on extremities.     Psych/Behavior: She is alert. She is oriented to person, place, and time. She has a normal mood and affect.     Musculoskeletal:        Neck: Range of motion is limited. ROM severity is 45 degree bilaterally.     Neurological:        Sensory:   Touch decreased in the C8 distribution bilaterally       DTRs: Tricep reflexes are 3+ on the right side and 3+ on the left side. Bicep reflexes are 3+ on the right side and 3+ on the left side. Brachioradialis reflexes are 3+ on the right side and 3+ on the left side. Patellar reflexes are 1+ on the right side and 2+ on the left side. Achilles reflexes are 0 on the right side and 0 on the left side.       Back Exam     Tenderness   The patient is experiencing tenderness in the cervical.    Muscle Strength   Right Quadriceps:  5/5   Left Quadriceps:  5/5   Right Hamstrings:  5/5   Left Hamstrings:  5/5               Neurologic Exam     Mental Status   Oriented to person, place, and time.   Speech: " speech is normal   Level of consciousness: alert    Cranial Nerves   Cranial nerves II through XII intact.     CN III, IV, VI   Pupils are equal, round, and reactive to light.  Extraocular motions are normal.     Motor Exam   Muscle bulk: normal  Overall muscle tone: normal    Strength   Right deltoid: 5/5  Left deltoid: 5/5  Right biceps: 5/5  Left biceps: 5/5  Right triceps: 5/5  Left triceps: 5/5  Right wrist flexion: 5/5  Left wrist flexion: 5/5  Right wrist extension: 5/5  Left wrist extension: 5/5  Right interossei: 4/5  Left interossei: 4/5  Right iliopsoas: 5/5  Left iliopsoas: 5/5  Right quadriceps: 5/5  Left quadriceps: 5/5  Right hamstrin/5  Left hamstrin/5  Right anterior tibial: 5/5  Left anterior tibial: 5/5  Right posterior tibial: 5/5  Left posterior tibial: 5/5  Right peroneal: 5/5  Left peroneal: 5/5  Right gastroc: 5/5  Left gastroc: 5/5    Sensory Exam   Touch decreased in the C8 distribution bilaterally     Gait, Coordination, and Reflexes     Gait  Gait: (antalgic)    Coordination   Finger to nose coordination: normal  Tandem walking coordination: abnormal    Reflexes   Right brachioradialis: 3+  Left brachioradialis: 3+  Right biceps: 3+  Left biceps: 3+  Right triceps: 3+  Left triceps: 3+  Right patellar: 1+  Left patellar: 2+  Right achilles: 0  Left achilles: 0  Right plantar: normal  Left plantar: normal  Right Darby: absent  Left Darby: absent  Right ankle clonus: absent  Left ankle clonus: absent        DIAGNOSTIC DATA:  I personally reviewed the following imaging:   Cervical spine MRI 2018: C5-6 and C6-7 anterior fusion with plating. C6-7 spondylosis with left foraminal stenosis, C7-T1 stenosis with severe bilateral  foraminal stenosis    ASSESMENT:  This is a 67 y.o. female with     Problem List Items Addressed This Visit     None      Visit Diagnoses     S/P cervical spinal fusion    -  Primary    Relevant Orders    X-Ray Cervical Spine AP And Lateral     Pseudoarthrosis of cervical spine, initial encounter        Relevant Orders    X-Ray Cervical Spine AP And Lateral    Cervical spondylosis with myelopathy and radiculopathy        Relevant Orders    X-Ray Cervical Spine AP And Lateral          PLAN:  I explained the natural history of the disease and all treatment options. I recommended a removal of anterior cervical plate and screws, revision of C6-7 anterior cervical discectomy and instrumented fusion, C7-T1 anterior cervical discectomy and instrumented fusion to prevent worsening of the radiculopathy and  myelopathy.     We have discussed the risks of surgery including bleeding, infection, failure of surgery, CSF leak, nerve root injury, spinal cord injury, weakness, paralysis, peripheral neuropathy, malplaced hardware, migration of hardware, non-union, need for reoperation. Patient understands the risks and would like to proceed with surgery.      The patient has increased perioperative risks because of these comorbidities: prior cervical fusion.         Oumar Guerrero MD  Pager: [phone]

## 2018-03-12 NOTE — LETTER
March 12, 2018      Vanna Rios MD  [address]  [address]  [facility] [address]           [place] [facility]  [address], [address]  [facility] [address]  Phone: [phone]          Patient: Marie Cano   MR Number: [id number]   YOB: 1950   Date of Visit: 3/12/2018       Dear Dr. Vanna Rios:    Thank you for referring Marie Cano to me for evaluation. Attached you will find relevant portions of my assessment and plan of care.    If you have questions, please do not hesitate to call me. I look forward to following Marie Cano along with you.    Sincerely,    Oumar Guerrero MD    Enclosure  CC:  No Recipients    If you would like to receive this communication electronically, please contact [email or web address] or [phone] to request more information on [employer] access.    For providers and/or their staff who would like to refer a patient to [facility], please contact us through our one-stop-shop provider referral line, [facility], at [phone].    If you feel you have received this communication in error or would no longer like to receive these types of communications, please e-mail [email or web address]

## 2018-03-14 ENCOUNTER — OFFICE VISIT (OUTPATIENT)
Dept: FAMILY MEDICINE | Facility: CLINIC | Age: 68
End: 2018-03-14
Payer: MEDICARE

## 2018-03-14 VITALS
BODY MASS INDEX: 42.99 KG/M2 | SYSTOLIC BLOOD PRESSURE: 138 MMHG | OXYGEN SATURATION: 97 % | TEMPERATURE: 99 F | HEART RATE: 90 BPM | HEIGHT: 65 IN | DIASTOLIC BLOOD PRESSURE: 70 MMHG | RESPIRATION RATE: 18 BRPM | WEIGHT: 258 LBS

## 2018-03-14 DIAGNOSIS — I10 ESSENTIAL HYPERTENSION: Primary | ICD-10-CM

## 2018-03-14 DIAGNOSIS — Z12.11 COLON CANCER SCREENING: ICD-10-CM

## 2018-03-14 DIAGNOSIS — Z78.0 MENOPAUSE: ICD-10-CM

## 2018-03-14 DIAGNOSIS — Z13.820 OSTEOPOROSIS SCREENING: ICD-10-CM

## 2018-03-14 PROCEDURE — 99214 OFFICE O/P EST MOD 30 MIN: CPT | Mod: PBBFAC,PN | Performed by: FAMILY MEDICINE

## 2018-03-14 PROCEDURE — 99214 OFFICE O/P EST MOD 30 MIN: CPT | Mod: S$PBB,,, | Performed by: FAMILY MEDICINE

## 2018-03-14 PROCEDURE — 99999 PR PBB SHADOW E&M-EST. PATIENT-LVL IV: CPT | Mod: PBBFAC,,, | Performed by: FAMILY MEDICINE

## 2018-03-14 NOTE — PROGRESS NOTES
HPI:  Marie Cano is a 67 y.o. year old female that  Presents because she had a fall because she lost her balance about 2 1/2 weeks ago and has been taking too much Baclofen. She now takes 1/2 tablet three times a day and she is in more pain but her balance is better.She denies any current difficulties form the fall.  She will be need a medical clearance for a surgery that she will be having in May , so we will not be doing her clearance today for her neck surgery.  Chief Complaint   Patient presents with    Follow-up     neurosurgery wants patient to have to have neck surgery--pt needs medical clearance    Medication Problem     Baclofen-pt states its making her dizzy, she has cut back on it    .     HPI      Past Medical History:   Diagnosis Date    Anxiety 5/7/2017    Arthritis 12/19/2015    Chronic pain syndrome 5/7/2017    Essential hypertension 5/7/2017    Hypercholesteremia 5/7/2017    Left knee pain 12/19/2015    SOB (shortness of breath) 12/19/2015    Spinal stenosis     Use of cane as ambulatory aid 5/7/2017    Vitamin D deficiency 5/7/2017     Social History     Social History    Marital status:      Spouse name: N/A    Number of children: N/A    Years of education: N/A     Occupational History    Not on file.     Social History Main Topics    Smoking status: Never Smoker    Smokeless tobacco: Never Used    Alcohol use No    Drug use: No    Sexual activity: Not on file     Other Topics Concern    Not on file     Social History Narrative    No narrative on file     Past Surgical History:   Procedure Laterality Date    BREAST SURGERY      CYST REMOVAL      FRACTURE SURGERY      bone fragments removed    HYSTERECTOMY      TONSILLECTOMY      TOTAL REDUCTION MAMMOPLASTY       Family History   Problem Relation Age of Onset    Ulcers Mother     Heart attack Father     Breast cancer Sister     Diabetes Brother     Kidney failure Brother     No Known Problems Daughter   "   Post-traumatic stress disorder Son     Cirrhosis Brother     Hepatitis Brother      C    No Known Problems Maternal Aunt     No Known Problems Maternal Uncle     No Known Problems Paternal Aunt     No Known Problems Paternal Uncle     No Known Problems Maternal Grandmother     No Known Problems Maternal Grandfather     No Known Problems Paternal Grandmother     No Known Problems Paternal Grandfather     Amblyopia Neg Hx     Blindness Neg Hx     Cancer Neg Hx     Cataracts Neg Hx     Glaucoma Neg Hx     Hypertension Neg Hx     Macular degeneration Neg Hx     Retinal detachment Neg Hx     Strabismus Neg Hx     Stroke Neg Hx     Thyroid disease Neg Hx            Review of Systems  General ROS: negative for chills, fever or weight loss  ENT ROS: negative for epistaxis, sore throat or rhinorrhea  Respiratory ROS: no cough, shortness of breath, or wheezing  Cardiovascular ROS: no chest pain or dyspnea on exertion  Gastrointestinal ROS: no abdominal pain, change in bowel habits, or black/ bloody stools  Neuro ROS: Improved dizziness.  Physical Exam:  /70 (BP Location: Right arm, Patient Position: Sitting, BP Method: Medium (Manual))   Pulse 90   Temp 98.8 °F (37.1 °C) (Oral)   Resp 18   Ht 5' 5" (1.651 m)   Wt 117 kg (258 lb)   SpO2 97%   BMI 42.93 kg/m²   General appearance: alert, cooperative, no distress  Constitutional:Oriented to person, place, and time.appears well-developed and well-nourished.  Lungs: clear to auscultation bilaterally, no dullness to percussion bilaterally  Heart: regular rate and rhythm without rub; no displacement of the PMI , S1&S2 present  Extremities: pt with cane, slow gait ,      Physical Exam    LABS:    Complete Blood Count  Lab Results   Component Value Date    RBC 4.62 09/19/2017    HGB 12.9 09/19/2017    HCT 39.4 09/19/2017    MCV 85.3 09/19/2017    MCH 27.9 09/19/2017    MCHC 32.7 09/19/2017    RDW 12.9 09/19/2017     (H) 09/19/2017    MPV " 8.7 09/19/2017    LYMPH 2,457 09/19/2017    LYMPH 37.8 09/19/2017    MONO 468 09/19/2017    MONO 7.2 09/19/2017     09/19/2017    BASO 59 09/19/2017    EOSINOPHIL 2.2 09/19/2017    BASOPHIL 0.9 09/19/2017       Comprehensive Metabolic Panel  Lab Results   Component Value Date     (H) 09/19/2017    BUN 18 09/19/2017    CREATININE 0.74 09/19/2017     09/19/2017    K 4.2 09/19/2017     09/19/2017    PROT 6.8 09/19/2017    ALBUMIN 4.2 09/19/2017    BILITOT 0.2 09/19/2017    AST 10 09/19/2017    ALKPHOS 89 09/19/2017    CO2 27 09/19/2017    ALT 10 09/19/2017    EGFRNONAA 85 09/19/2017    ESTGFRAFRICA 99 09/19/2017       LIPID  Lab Results   Component Value Date    CHOL 236 (H) 09/19/2017    HDL 50 (L) 09/19/2017         TSH  Lab Results   Component Value Date    TSH 3.22 09/19/2017       Current Outpatient Prescriptions   Medication Sig Dispense Refill    baclofen (LIORESAL) 20 MG tablet TAKE 1/2 TO 1 TABLET BY MOUTH THREE TIMES A DAY AS NEEDED (Patient taking differently: Take 1/2 pill three times a day) 90 tablet 0    doxepin (SINEQUAN) 10 MG capsule TAKE 1-2 CAPSULES BY MOUTH AT BEDTIME 180 capsule 0    duloxetine (CYMBALTA) 60 MG capsule TAKE 1 CAPSULE BY MOUTH DAILY 90 capsule 0    irbesartan (AVAPRO) 300 MG tablet Take 1 tablet (300 mg total) by mouth every evening. 90 tablet 1    levocetirizine (XYZAL) 5 MG tablet TAKE 1 TABLET BY MOUTH DAILY EVERY EVENING 30 tablet 2    LYRICA 150 mg capsule TAKE 1 CAPSULE BY MOUTH FOUR TIMES A  capsule 2    meloxicam (MOBIC) 15 MG tablet TAKE 1 TABLET BY MOUTH DAILY 90 tablet 1    propranolol (INDERAL) 10 MG tablet Take 1 tablet (10 mg total) by mouth 2 (two) times daily as needed. tremors 60 tablet 5    rosuvastatin (CRESTOR) 10 MG tablet Take 1 tablet (10 mg total) by mouth once daily. For cholesterol 90 tablet 3    sertraline (ZOLOFT) 50 MG tablet Take two tablet daily 60 tablet 2    temazepam (RESTORIL) 30 mg capsule TAKE 1 CAPSULE  BY MOUTH AT BEDTIME AS NEEDED FOR INSOMNIA 30 capsule 5    traMADol (ULTRAM) 50 mg tablet Take 1 tablet (50 mg total) by mouth 3 (three) times daily as needed. 270 tablet 1     No current facility-administered medications for this visit.        Assessment:    ICD-10-CM ICD-9-CM    1. Essential hypertension I10 401.9    2. Osteoporosis screening Z13.820 V82.81 DXA Bone Density Spine And Hip   3. Menopause Z78.0 627.2 DXA Bone Density Spine And Hip   4. Colon cancer screening Z12.11 V76.51 Fecal Immunochemical Test (iFOBT)         Plan:    No Follow-up on file.          Vanna Rios MD

## 2018-03-21 ENCOUNTER — TELEPHONE (OUTPATIENT)
Dept: NEUROSURGERY | Facility: CLINIC | Age: 68
End: 2018-03-21

## 2018-03-21 DIAGNOSIS — J30.2 SEASONAL ALLERGIES: ICD-10-CM

## 2018-03-21 DIAGNOSIS — B02.29 POSTHERPETIC NEURALGIA: ICD-10-CM

## 2018-03-21 NOTE — TELEPHONE ENCOUNTER
Returned call scheduled pt's sx on 05/17/18 here in [place]    ----- Message from Marcel Orellana sent at 3/21/2018  3:49 PM CDT -----  Contact: [phone] self  Patient would like to speak with the nurse about scheduling her surgery. Please call and advise.

## 2018-03-22 ENCOUNTER — TELEPHONE (OUTPATIENT)
Dept: NEUROSURGERY | Facility: CLINIC | Age: 68
End: 2018-03-22

## 2018-03-22 ENCOUNTER — APPOINTMENT (OUTPATIENT)
Dept: LAB | Facility: HOSPITAL | Age: 68
End: 2018-03-22
Attending: FAMILY MEDICINE
Payer: MEDICARE

## 2018-03-22 DIAGNOSIS — M47.12 CERVICAL SPONDYLOSIS WITH MYELOPATHY: ICD-10-CM

## 2018-03-22 DIAGNOSIS — Z98.1 ARTHRODESIS STATUS: Primary | ICD-10-CM

## 2018-03-22 DIAGNOSIS — S12.000D: ICD-10-CM

## 2018-03-22 DIAGNOSIS — M47.22 CERVICAL SPONDYLOSIS WITH RADICULOPATHY: ICD-10-CM

## 2018-03-22 RX ORDER — PREGABALIN 150 MG/1
CAPSULE ORAL
Qty: 120 CAPSULE | Refills: 0 | Status: SHIPPED | OUTPATIENT
Start: 2018-03-22 | End: 2018-06-21 | Stop reason: SDUPTHER

## 2018-03-22 RX ORDER — LEVOCETIRIZINE DIHYDROCHLORIDE 5 MG/1
TABLET, FILM COATED ORAL
Qty: 30 TABLET | Refills: 2 | Status: SHIPPED | OUTPATIENT
Start: 2018-03-22 | End: 2018-05-11 | Stop reason: SDUPTHER

## 2018-03-22 NOTE — TELEPHONE ENCOUNTER
----- Message from Nickie Mariscal sent at 3/22/2018  3:35 PM CDT -----  Contact: Self/ [phone]  Patient called in to schedule her re opt. She also has been trying to get in touch with bone density for a test but has not contacted anyone.    Please call and advise.

## 2018-03-27 ENCOUNTER — HOSPITAL ENCOUNTER (OUTPATIENT)
Dept: RADIOLOGY | Facility: HOSPITAL | Age: 68
Discharge: HOME | End: 2018-03-27
Attending: FAMILY MEDICINE
Payer: MEDICARE

## 2018-03-27 DIAGNOSIS — Z13.820 OSTEOPOROSIS SCREENING: ICD-10-CM

## 2018-03-27 DIAGNOSIS — Z78.0 MENOPAUSE: ICD-10-CM

## 2018-03-27 PROCEDURE — 77080 DXA BONE DENSITY AXIAL: CPT | Mod: TC

## 2018-03-27 PROCEDURE — 77080 DXA BONE DENSITY AXIAL: CPT | Mod: 26,,, | Performed by: RADIOLOGY

## 2018-03-30 DIAGNOSIS — F43.10 PTSD (POST-TRAUMATIC STRESS DISORDER): ICD-10-CM

## 2018-04-02 RX ORDER — SERTRALINE HYDROCHLORIDE 50 MG/1
TABLET, FILM COATED ORAL
Qty: 60 TABLET | Refills: 2 | Status: SHIPPED | OUTPATIENT
Start: 2018-04-02 | End: 2018-07-11 | Stop reason: SDUPTHER

## 2018-04-17 RX ORDER — PROPRANOLOL HYDROCHLORIDE 10 MG/1
10 TABLET ORAL 2 TIMES DAILY PRN
Qty: 60 TABLET | Refills: 5 | Status: SHIPPED | OUTPATIENT
Start: 2018-04-17 | End: 2018-07-03 | Stop reason: SDUPTHER

## 2018-05-08 ENCOUNTER — OFFICE VISIT (OUTPATIENT)
Dept: FAMILY MEDICINE | Facility: CLINIC | Age: 68
End: 2018-05-08
Payer: MEDICARE

## 2018-05-08 VITALS
OXYGEN SATURATION: 97 % | HEART RATE: 82 BPM | DIASTOLIC BLOOD PRESSURE: 86 MMHG | BODY MASS INDEX: 43.32 KG/M2 | SYSTOLIC BLOOD PRESSURE: 128 MMHG | RESPIRATION RATE: 18 BRPM | WEIGHT: 260 LBS | TEMPERATURE: 99 F | HEIGHT: 65 IN

## 2018-05-08 DIAGNOSIS — I10 ESSENTIAL HYPERTENSION: ICD-10-CM

## 2018-05-08 DIAGNOSIS — M48.03 SPINAL STENOSIS OF CERVICOTHORACIC REGION: ICD-10-CM

## 2018-05-08 DIAGNOSIS — F43.10 PTSD (POST-TRAUMATIC STRESS DISORDER): ICD-10-CM

## 2018-05-08 DIAGNOSIS — Z01.818 PRE-OP TESTING: Primary | ICD-10-CM

## 2018-05-08 PROCEDURE — 93005 ELECTROCARDIOGRAM TRACING: CPT | Mod: PBBFAC,PN | Performed by: FAMILY MEDICINE

## 2018-05-08 PROCEDURE — 99214 OFFICE O/P EST MOD 30 MIN: CPT | Mod: PBBFAC,PN,25 | Performed by: FAMILY MEDICINE

## 2018-05-08 PROCEDURE — 99999 PR PBB SHADOW E&M-EST. PATIENT-LVL IV: CPT | Mod: PBBFAC,,, | Performed by: FAMILY MEDICINE

## 2018-05-08 PROCEDURE — 99215 OFFICE O/P EST HI 40 MIN: CPT | Mod: 25,S$PBB,, | Performed by: FAMILY MEDICINE

## 2018-05-08 PROCEDURE — 93010 ELECTROCARDIOGRAM REPORT: CPT | Mod: ,,, | Performed by: INTERNAL MEDICINE

## 2018-05-08 RX ORDER — ERGOCALCIFEROL 1.25 MG/1
CAPSULE ORAL
Refills: 3 | COMMUNITY
Start: 2018-03-12 | End: 2018-07-11 | Stop reason: SDUPTHER

## 2018-05-08 NOTE — PROGRESS NOTES
HPI:  Marie Cano is a 67 y.o. year old female that  presents for pre-op clearance for her cervical fusion. Her surgery has been postponed and it is now scheduled for June.   Chief Complaint   Patient presents with    Pre-op Exam     neck surgery on 6/14   .     HPI    Past Medical History:   Diagnosis Date    Anxiety 5/7/2017    Arthritis 12/19/2015    Chronic pain syndrome 5/7/2017    Essential hypertension 5/7/2017    Hypercholesteremia 5/7/2017    Left knee pain 12/19/2015    SOB (shortness of breath) 12/19/2015    Spinal stenosis     Use of cane as ambulatory aid 5/7/2017    Vitamin D deficiency 5/7/2017     Social History     Social History    Marital status:      Spouse name: N/A    Number of children: N/A    Years of education: N/A     Occupational History    Not on file.     Social History Main Topics    Smoking status: Never Smoker    Smokeless tobacco: Never Used    Alcohol use No    Drug use: No    Sexual activity: Not on file     Other Topics Concern    Not on file     Social History Narrative    No narrative on file     Past Surgical History:   Procedure Laterality Date    BREAST SURGERY      CYST REMOVAL      FRACTURE SURGERY      bone fragments removed    HYSTERECTOMY      TONSILLECTOMY      TOTAL REDUCTION MAMMOPLASTY       Family History   Problem Relation Age of Onset    Ulcers Mother     Heart attack Father     Breast cancer Sister     Diabetes Brother     Kidney failure Brother     No Known Problems Daughter     Post-traumatic stress disorder Son     Cirrhosis Brother     Hepatitis Brother         C    No Known Problems Maternal Aunt     No Known Problems Maternal Uncle     No Known Problems Paternal Aunt     No Known Problems Paternal Uncle     No Known Problems Maternal Grandmother     No Known Problems Maternal Grandfather     No Known Problems Paternal Grandmother     No Known Problems Paternal Grandfather     Amblyopia Neg Hx      "Blindness Neg Hx     Cancer Neg Hx     Cataracts Neg Hx     Glaucoma Neg Hx     Hypertension Neg Hx     Macular degeneration Neg Hx     Retinal detachment Neg Hx     Strabismus Neg Hx     Stroke Neg Hx     Thyroid disease Neg Hx            Review of Systems  General ROS: negative for chills, fever or weight loss  Psychological ROS: negative for hallucination, depression or suicidal ideation  Ophthalmic ROS: negative for blurry vision, photophobia or eye pain  ENT ROS: negative for epistaxis, sore throat or rhinorrhea  Respiratory ROS: no cough, shortness of breath, or wheezing  Cardiovascular ROS: no chest pain or dyspnea on exertion  Gastrointestinal ROS: no abdominal pain, change in bowel habits, or black/ bloody stools  Genito-Urinary ROS: no dysuria, trouble voiding, or hematuria  Musculoskeletal ROS: negative for gait disturbance or muscular weakness  Neurological ROS: no syncope or seizures; no ataxia  Dermatological ROS: negative for pruritis, rash and jaundice      Physical Exam:  /86 (BP Location: Right arm, Patient Position: Sitting, BP Method: Medium (Manual))   Pulse 82   Temp 98.6 °F (37 °C) (Oral)   Resp 18   Ht 5' 5" (1.651 m)   Wt 117.9 kg (260 lb)   SpO2 97%   BMI 43.27 kg/m²   General appearance: alert, cooperative, no distress  Constitutional:Oriented to person, place, and time.appears well-developed and well-nourished.  HEENT: Normocephalic, atraumatic, neck symmetrical, no nasal discharge, TM - clear bilaterally   Eyes: conjunctivae/corneas clear, PERRL, EOM's intact  Lungs: clear to auscultation bilaterally, no dullness to percussion bilaterally  Heart: regular rate and rhythm without rub; no displacement of the PMI   Abdomen: soft, non-tender; bowel sounds normoactive; no organomegaly  Extremities: extremities symmetric; no clubbing, cyanosis, or edema  Integument: Skin color, texture, turgor normal; no rashes; hair distrubution normal  Neurologic: Alert and oriented X 3, " normal strength, normal coordination and gait  Psychiatric: no pressured speech; normal affect; no evidence of impaired cognition   Physical Exam  LABS:    Complete Blood Count  Lab Results   Component Value Date    RBC 4.62 09/19/2017    HGB 12.9 09/19/2017    HCT 39.4 09/19/2017    MCV 85.3 09/19/2017    MCH 27.9 09/19/2017    MCHC 32.7 09/19/2017    RDW 12.9 09/19/2017     (H) 09/19/2017    MPV 8.7 09/19/2017    LYMPH 2,457 09/19/2017    LYMPH 37.8 09/19/2017    MONO 468 09/19/2017    MONO 7.2 09/19/2017     09/19/2017    BASO 59 09/19/2017    EOSINOPHIL 2.2 09/19/2017    BASOPHIL 0.9 09/19/2017       Comprehensive Metabolic Panel  Lab Results   Component Value Date     (H) 09/19/2017    BUN 18 09/19/2017    CREATININE 0.74 09/19/2017     09/19/2017    K 4.2 09/19/2017     09/19/2017    PROT 6.8 09/19/2017    ALBUMIN 4.2 09/19/2017    BILITOT 0.2 09/19/2017    AST 10 09/19/2017    ALKPHOS 89 09/19/2017    CO2 27 09/19/2017    ALT 10 09/19/2017    EGFRNONAA 85 09/19/2017    ESTGFRAFRICA 99 09/19/2017       LIPID  Lab Results   Component Value Date    CHOL 236 (H) 09/19/2017    HDL 50 (L) 09/19/2017       TSH  Lab Results   Component Value Date    TSH 3.22 09/19/2017       Current Outpatient Prescriptions   Medication Sig Dispense Refill    baclofen (LIORESAL) 20 MG tablet TAKE 1/2 TO 1 TABLET BY MOUTH THREE TIMES A DAY AS NEEDED 90 tablet 2    doxepin (SINEQUAN) 10 MG capsule TAKE 1-2 CAPSULES BY MOUTH AT BEDTIME 180 capsule 0    duloxetine (CYMBALTA) 60 MG capsule TAKE 1 CAPSULE BY MOUTH DAILY 90 capsule 0    ergocalciferol (ERGOCALCIFEROL) 50,000 unit Cap TAKE 1 CAPSULE BY MOUTH TWICE A WEEK  3    irbesartan (AVAPRO) 300 MG tablet Take 1 tablet (300 mg total) by mouth every evening. 90 tablet 1    levocetirizine (XYZAL) 5 MG tablet TAKE 1 TABLET BY MOUTH DAILY EVERY EVENING 30 tablet 2    LYRICA 150 mg capsule TAKE 1 CAPSULE BY MOUTH FOUR TIMES A  capsule 0     meloxicam (MOBIC) 15 MG tablet TAKE 1 TABLET BY MOUTH DAILY 90 tablet 1    propranolol (INDERAL) 10 MG tablet Take 1 tablet (10 mg total) by mouth 2 (two) times daily as needed. tremors 60 tablet 5    rosuvastatin (CRESTOR) 10 MG tablet Take 1 tablet (10 mg total) by mouth once daily. For cholesterol 90 tablet 3    sertraline (ZOLOFT) 50 MG tablet TAKE 2 TABLETS BY MOUTH DAILY 60 tablet 2    temazepam (RESTORIL) 30 mg capsule TAKE 1 CAPSULE BY MOUTH AT BEDTIME AS NEEDED FOR INSOMNIA 30 capsule 5    traMADol (ULTRAM) 50 mg tablet Take 1 tablet (50 mg total) by mouth 3 (three) times daily as needed. 270 tablet 1     No current facility-administered medications for this visit.        Assessment:    ICD-10-CM ICD-9-CM    1. Pre-op testing Z01.818 V72.84 Comprehensive metabolic panel      Lipid panel      CBC auto differential      TSH      X-Ray Chest PA And Lateral      IN OFFICE EKG 12-LEAD (to Muse)   2. Spinal stenosis of cervicothoracic region M48.03 723.0    3. Essential hypertension I10 401.9 Comprehensive metabolic panel      Lipid panel      TSH      IN OFFICE EKG 12-LEAD (to Muse)   4. PTSD (post-traumatic stress disorder) F43.10 309.81          Plan:  Pt is cleared for surgery   Labs reviewed  Follow-up in 1 month (on 6/8/2018).          Vanna Rios MD

## 2018-05-11 DIAGNOSIS — J30.2 SEASONAL ALLERGIES: ICD-10-CM

## 2018-05-16 RX ORDER — LEVOCETIRIZINE DIHYDROCHLORIDE 5 MG/1
TABLET, FILM COATED ORAL
Qty: 30 TABLET | Refills: 2 | Status: SHIPPED | OUTPATIENT
Start: 2018-05-16 | End: 2018-07-11 | Stop reason: SDUPTHER

## 2018-05-17 ENCOUNTER — HOSPITAL ENCOUNTER (OUTPATIENT)
Dept: RADIOLOGY | Facility: HOSPITAL | Age: 68
Discharge: HOME | End: 2018-05-17
Attending: FAMILY MEDICINE
Payer: MEDICARE

## 2018-05-17 DIAGNOSIS — Z01.818 PRE-OP TESTING: ICD-10-CM

## 2018-05-17 PROCEDURE — 71046 X-RAY EXAM CHEST 2 VIEWS: CPT | Mod: 26,,, | Performed by: RADIOLOGY

## 2018-05-17 PROCEDURE — 71046 X-RAY EXAM CHEST 2 VIEWS: CPT | Mod: TC,FY

## 2018-05-21 DIAGNOSIS — F32.A DEPRESSION, UNSPECIFIED DEPRESSION TYPE: ICD-10-CM

## 2018-05-21 NOTE — TELEPHONE ENCOUNTER
----- Message from Lucila Mancia sent at 5/21/2018  4:50 PM CDT -----  Contact: [phone]/self  Patient is requesting to have a refill of duloxetine (CYMBALTA) 60 MG capsule  sent to [facility] - [facility] [place], [place] - [address] . Please advise.

## 2018-05-23 RX ORDER — DULOXETIN HYDROCHLORIDE 60 MG/1
60 CAPSULE, DELAYED RELEASE ORAL DAILY
Qty: 90 CAPSULE | Refills: 0 | Status: SHIPPED | OUTPATIENT
Start: 2018-05-23 | End: 2018-07-11 | Stop reason: SDUPTHER

## 2018-05-31 ENCOUNTER — TELEPHONE (OUTPATIENT)
Dept: FAMILY MEDICINE | Facility: CLINIC | Age: 68
End: 2018-05-31

## 2018-05-31 NOTE — TELEPHONE ENCOUNTER
----- Message from Nickie Mariscal sent at 5/31/2018  9:35 AM CDT -----  Contact: Self/ [phone]  Patient would like to be seen sooner than the next available appointment.     Please call and advise.

## 2018-06-04 DIAGNOSIS — M19.90 ARTHRITIS: ICD-10-CM

## 2018-06-05 RX ORDER — MELOXICAM 15 MG/1
15 TABLET ORAL DAILY
Qty: 90 TABLET | Refills: 0 | Status: ON HOLD | OUTPATIENT
Start: 2018-06-05 | End: 2018-06-15 | Stop reason: HOSPADM

## 2018-06-08 ENCOUNTER — ANESTHESIA EVENT (OUTPATIENT)
Dept: SURGERY | Facility: HOSPITAL | Age: 68
DRG: 472 | End: 2018-06-08
Payer: MEDICARE

## 2018-06-08 ENCOUNTER — HOSPITAL ENCOUNTER (OUTPATIENT)
Dept: PREADMISSION TESTING | Facility: HOSPITAL | Age: 68
Discharge: HOME | End: 2018-06-08
Attending: PODIATRIST
Payer: MEDICARE

## 2018-06-08 VITALS
OXYGEN SATURATION: 98 % | WEIGHT: 265 LBS | RESPIRATION RATE: 20 BRPM | SYSTOLIC BLOOD PRESSURE: 125 MMHG | DIASTOLIC BLOOD PRESSURE: 81 MMHG | HEIGHT: 65 IN | BODY MASS INDEX: 44.15 KG/M2 | HEART RATE: 78 BPM

## 2018-06-08 DIAGNOSIS — Z01.818 PREOP TESTING: Primary | ICD-10-CM

## 2018-06-08 RX ORDER — LIDOCAINE HYDROCHLORIDE 10 MG/ML
1 INJECTION, SOLUTION EPIDURAL; INFILTRATION; INTRACAUDAL; PERINEURAL ONCE
Status: CANCELLED | OUTPATIENT
Start: 2018-06-08 | End: 2018-06-08

## 2018-06-08 RX ORDER — SODIUM CHLORIDE, SODIUM LACTATE, POTASSIUM CHLORIDE, CALCIUM CHLORIDE 600; 310; 30; 20 MG/100ML; MG/100ML; MG/100ML; MG/100ML
INJECTION, SOLUTION INTRAVENOUS CONTINUOUS
Status: CANCELLED | OUTPATIENT
Start: 2018-06-08

## 2018-06-08 NOTE — ANESTHESIA PREPROCEDURE EVALUATION
06/08/2018  Marie Cano is a 67 y.o., female for removal of hardware anterior cervical disc C6-C7 and revision of C6-C7 and arthrodesis C7-T1 with instrumentation under [facility] on 6-14-18    Past Medical History:   Diagnosis Date    Anxiety 5/7/2017    Arthritis 12/19/2015    Chronic pain syndrome 5/7/2017    Essential hypertension 5/7/2017    Hypercholesteremia 5/7/2017    Left knee pain 12/19/2015    SOB (shortness of breath) 12/19/2015    Spinal stenosis     Use of cane as ambulatory aid 5/7/2017    Vitamin D deficiency 5/7/2017       Past Surgical History:   Procedure Laterality Date    BREAST SURGERY      CYST REMOVAL      FRACTURE SURGERY      bone fragments removed, leg    HYSTERECTOMY      TONSILLECTOMY      TOTAL REDUCTION MAMMOPLASTY             Anesthesia Evaluation    I have reviewed the Patient Summary Reports.    I have reviewed the Nursing Notes.   I have reviewed the Medications.     Review of Systems  Anesthesia Hx:  No problems with previous Anesthesia  Denies Family Hx of Anesthesia complications.   Denies Personal Hx of Anesthesia complications.   Social:  Non-Smoker, No Alcohol Use    Hematology/Oncology:  Hematology Normal   Oncology Normal     EENT/Dental:   chronic allergic rhinitis (takes meds daily)   Cardiovascular:   Exercise tolerance: poor Hypertension, well controlled hyperlipidemia FRANKLIN Denies cp/sob  Uses cane at all times outside the house  <2METS  Sees PCP for HTN-Dr. Rios in [facility]- last seen 2 months ago. Aware pt is having surgery.   Pulmonary:   Shortness of breath (FRANKLIN)    Renal/:  Renal/ Normal     Hepatic/GI:  Hepatic/GI Normal    Musculoskeletal:   Arthritis (generalized)  Spinal stenosis Spine Disorders: lumbar and cervical    Neurological:   Neuromuscular Disease, (spinal stenosis and left knee pain)   Chronic Pain Syndrome   Endocrine:    Vit D deficiency   Psych:   Psychiatric History anxiety          Physical Exam  General:  Morbid Obesity    Airway/Jaw/Neck:  Airway Findings: Mouth Opening: Normal Tongue: Normal  General Airway Assessment: Adult  Mallampati: IV  Improves to III with phonation.  TM Distance: 4 - 6 cm  Jaw/Neck Findings:  Neck ROM: Normal ROM  Neck Findings:  Girth Increased      Dental:  Dental Findings: Periodontal disease, Mild   Chest/Lungs:  Chest/Lungs Findings: Clear to auscultation, Normal Respiratory Rate     Heart/Vascular:  Heart Findings: Rate: Normal  Rhythm: Regular Rhythm  Sounds: Normal        Mental Status:  Mental Status Findings:  Cooperative, Alert and Oriented         Anesthesia Plan  Type of Anesthesia, risks & benefits discussed:  Anesthesia Type:  general  Patient's Preference:   Intra-op Monitoring Plan:   Intra-op Monitoring Plan Comments:   Post Op Pain Control Plan:   Post Op Pain Control Plan Comments:   Induction:   IV  Beta Blocker:         Informed Consent: Patient understands risks and agrees with Anesthesia plan.  Questions answered.   ASA Score: 3     Day of Surgery Review of History & Physical:        Anesthesia Plan Notes: Instructed pt to notify Dr. Rios for risk stratification and see if need for optimization for cervical surgery under general anesthesia        Ready For Surgery From Anesthesia Perspective.     Normal sinus rhythm  Normal ECG  No previous ECGs available  Confirmed by Frank Noyola MD ([id number]) on 5/8/2018 12:21:30 PM    Referred By:             Confirmed By:Frank Noyola MD      Specimen Collected: 05/08/18 10:10 Last Resulted: 05/08/18 12:21

## 2018-06-08 NOTE — PRE-PROCEDURE INSTRUCTIONS
Message sent to Dr. Rios in [facility] to address clearance status in [facility] prior to pt's surgery date

## 2018-06-08 NOTE — DISCHARGE INSTRUCTIONS
Your surgery is scheduled for 6/14/18.    Please report to Outpatient Surgery Intake Office on the 2nd FLOOR at 8:00 a.m.          INSTRUCTIONS IMPORTANT!!!  ¨ Do not eat or drink after 12 midnight-including water. OK to brush teeth, no   gum, candy or mints!    ¨ Take only these medicines with a small swallow of water-morning of surgery.        ____  Proceed to [facility] on 6/8/18 for additional blood test.        ____  Do not wear makeup, including mascara.  ____  No powder, lotions or creams to surgical area.  ____  Please remove all jewelry, including piercings and leave at home.  ____  No money or valuables needed. Please leave at home.  ____  Please bring any documents given by your doctor.  ____  If going home the same day, arrange for a ride home. You will not be able to             drive if Anesthesia was used.  ____  Wear loose fitting clothing. Allow for dressings, bandages.  ____  Stop Aspirin, Ibuprofen, Motrin and Aleve at least 3-5 days before surgery, unless otherwise instructed by your doctor, or the nurse.   You MAY use Tylenol/acetaminophen until day of surgery.  ____  Wash the surgical area with Hibiclens the night before surgery, and again the             morning of surgery.  Be sure to rinse hibiclens off completely (if instructed by   nurse).  ____  If you take diabetic medication, do not take am of surgery unless instructed by Doctor.  ____  Call MD for temperature above 101 degrees.  ____ Stop taking any Fish Oil supplement or any Vitamins that contain Vitamin E at least 5 days prior to surgery.  ____ Do Not wear your contact lenses the day of your procedure.  You may wear your glasses.        I have read or had read and explained to me, and understand the above information.  Additional comments or instructions:  For additional questions call [phone]      Pre-Op Bathing Instructions    Before surgery, you can play an important role in your own health.    Because skin is not  sterile, we need to be sure that your skin is as free of germs as possible. By following the instructions below, you can reduce the number of germs on your skin before surgery.    IMPORTANT: You will need to shower with a special soap called Hibiclens*, available at any pharmacy.  If you are allergic to Chlorhexidine (the antiseptic in Hibiclens), use an antibacterial soap such as Dial Soap for your preoperative shower.  You will shower with Hibiclens both the night before your surgery and the morning of your surgery.  Do not use Hibiclens on the head, face or genitals to avoid injury to those areas.    STEP #1: THE NIGHT BEFORE YOUR SURGERY     1. Do not shave the area of your body where your surgery will be performed.  2. Shower and wash your hair and body as usual with your normal soap and shampoo.  3. Rinse your hair and body thoroughly after you shower to remove all soap residue.  4. With your hand, apply one packet of Hibiclens soap to the surgical site.   5. Wash the site gently for five (5) minutes. Do not scrub your skin too hard.   6. Do not wash with your regular soap after Hibiclens is used.  7. Rinse your body thoroughly.  8. Pat yourself dry with a clean, soft towel.  9. Do not use lotion, cream, or powder.  10. Wear clean clothes.    STEP #2: THE MORNING OF YOUR SURGERY     1. Repeat Step #1.    * Not to be used by people allergic to Chlorhexidine.

## 2018-06-08 NOTE — PRE-PROCEDURE INSTRUCTIONS
riri Posadas - [phone]    Allergies, medical, surgical, family and psychosocial histories reviewed with patient. Periop plan of care reviewed. Preop instructions given, including medications to take and to hold. Time allotted for questions to be addressed.  Patient verbalized understanding.

## 2018-06-11 ENCOUNTER — TELEPHONE (OUTPATIENT)
Dept: FAMILY MEDICINE | Facility: CLINIC | Age: 68
End: 2018-06-11

## 2018-06-11 NOTE — TELEPHONE ENCOUNTER
----- Message from Amira Pedro sent at 6/11/2018  9:13 AM CDT -----  Contact: Self .[phone]  Patient would like to speak with you about needling authorization for her surgery. Please advise

## 2018-06-14 ENCOUNTER — HOSPITAL ENCOUNTER (INPATIENT)
Facility: HOSPITAL | Age: 68
LOS: 2 days | Discharge: HOME HEALTH | DRG: 472 | End: 2018-06-16
Attending: NEUROLOGICAL SURGERY | Admitting: NEUROLOGICAL SURGERY
Payer: MEDICARE

## 2018-06-14 ENCOUNTER — ANESTHESIA (OUTPATIENT)
Dept: SURGERY | Facility: HOSPITAL | Age: 68
DRG: 472 | End: 2018-06-14
Payer: MEDICARE

## 2018-06-14 DIAGNOSIS — M47.12 CERVICAL SPONDYLOSIS WITH MYELOPATHY AND RADICULOPATHY: Primary | ICD-10-CM

## 2018-06-14 DIAGNOSIS — M47.22 CERVICAL SPONDYLOSIS WITH MYELOPATHY AND RADICULOPATHY: Primary | ICD-10-CM

## 2018-06-14 LAB — POCT GLUCOSE: 116 MG/DL (ref 70–110)

## 2018-06-14 PROCEDURE — 22551 ARTHRD ANT NTRBDY CERVICAL: CPT | Mod: 22,AS,, | Performed by: PHYSICIAN ASSISTANT

## 2018-06-14 PROCEDURE — 25000003 PHARM REV CODE 250: Performed by: PHYSICIAN ASSISTANT

## 2018-06-14 PROCEDURE — 22853 INSJ BIOMECHANICAL DEVICE: CPT | Mod: AS,,, | Performed by: PHYSICIAN ASSISTANT

## 2018-06-14 PROCEDURE — 22552 ARTHRD ANT NTRBD CERVICAL EA: CPT | Mod: AS,,, | Performed by: PHYSICIAN ASSISTANT

## 2018-06-14 PROCEDURE — 36000709 HC OR TIME LEV III EA ADD 15 MIN: Performed by: NEUROLOGICAL SURGERY

## 2018-06-14 PROCEDURE — 99900035 HC TECH TIME PER 15 MIN (STAT)

## 2018-06-14 PROCEDURE — 94799 UNLISTED PULMONARY SVC/PX: CPT

## 2018-06-14 PROCEDURE — 88300 SURGICAL PATH GROSS: CPT | Performed by: PATHOLOGY

## 2018-06-14 PROCEDURE — 22845 INSERT SPINE FIXATION DEVICE: CPT | Mod: 59,,, | Performed by: NEUROLOGICAL SURGERY

## 2018-06-14 PROCEDURE — P9045 ALBUMIN (HUMAN), 5%, 250 ML: HCPCS | Mod: JG | Performed by: NURSE ANESTHETIST, CERTIFIED REGISTERED

## 2018-06-14 PROCEDURE — 94002 VENT MGMT INPAT INIT DAY: CPT

## 2018-06-14 PROCEDURE — 63600175 PHARM REV CODE 636 W HCPCS: Performed by: PHYSICIAN ASSISTANT

## 2018-06-14 PROCEDURE — 11000001 HC ACUTE MED/SURG PRIVATE ROOM

## 2018-06-14 PROCEDURE — 20930 SP BONE ALGRFT MORSEL ADD-ON: CPT | Mod: ,,, | Performed by: NEUROLOGICAL SURGERY

## 2018-06-14 PROCEDURE — 25000003 PHARM REV CODE 250: Performed by: ANESTHESIOLOGY

## 2018-06-14 PROCEDURE — 0RG40A0 FUSION OF CERVICOTHORACIC VERTEBRAL JOINT WITH INTERBODY FUSION DEVICE, ANTERIOR APPROACH, ANTERIOR COLUMN, OPEN APPROACH: ICD-10-PCS | Performed by: NEUROLOGICAL SURGERY

## 2018-06-14 PROCEDURE — 0RT30ZZ RESECTION OF CERVICAL VERTEBRAL DISC, OPEN APPROACH: ICD-10-PCS | Performed by: NEUROLOGICAL SURGERY

## 2018-06-14 PROCEDURE — 0PP304Z REMOVAL OF INTERNAL FIXATION DEVICE FROM CERVICAL VERTEBRA, OPEN APPROACH: ICD-10-PCS | Performed by: NEUROLOGICAL SURGERY

## 2018-06-14 PROCEDURE — 20936 SP BONE AGRFT LOCAL ADD-ON: CPT | Mod: ,,, | Performed by: NEUROLOGICAL SURGERY

## 2018-06-14 PROCEDURE — 27800903 OPTIME MED/SURG SUP & DEVICES OTHER IMPLANTS: Performed by: NEUROLOGICAL SURGERY

## 2018-06-14 PROCEDURE — 27000221 HC OXYGEN, UP TO 24 HOURS

## 2018-06-14 PROCEDURE — 37000009 HC ANESTHESIA EA ADD 15 MINS: Performed by: NEUROLOGICAL SURGERY

## 2018-06-14 PROCEDURE — 0RT50ZZ RESECTION OF CERVICOTHORACIC VERTEBRAL DISC, OPEN APPROACH: ICD-10-PCS | Performed by: NEUROLOGICAL SURGERY

## 2018-06-14 PROCEDURE — 63600175 PHARM REV CODE 636 W HCPCS: Performed by: NEUROLOGICAL SURGERY

## 2018-06-14 PROCEDURE — 36000708 HC OR TIME LEV III 1ST 15 MIN: Performed by: NEUROLOGICAL SURGERY

## 2018-06-14 PROCEDURE — 22853 INSJ BIOMECHANICAL DEVICE: CPT | Mod: ,,, | Performed by: NEUROLOGICAL SURGERY

## 2018-06-14 PROCEDURE — 27800505 HC CATH, RADIAL ARTERY KIT: Performed by: NURSE ANESTHETIST, CERTIFIED REGISTERED

## 2018-06-14 PROCEDURE — 25000003 PHARM REV CODE 250: Performed by: NEUROLOGICAL SURGERY

## 2018-06-14 PROCEDURE — 22845 INSERT SPINE FIXATION DEVICE: CPT | Mod: 59,AS,, | Performed by: PHYSICIAN ASSISTANT

## 2018-06-14 PROCEDURE — 27201423 OPTIME MED/SURG SUP & DEVICES STERILE SUPPLY: Performed by: NEUROLOGICAL SURGERY

## 2018-06-14 PROCEDURE — 94761 N-INVAS EAR/PLS OXIMETRY MLT: CPT

## 2018-06-14 PROCEDURE — 37000008 HC ANESTHESIA 1ST 15 MINUTES: Performed by: NEUROLOGICAL SURGERY

## 2018-06-14 PROCEDURE — 86920 COMPATIBILITY TEST SPIN: CPT

## 2018-06-14 PROCEDURE — 63600175 PHARM REV CODE 636 W HCPCS

## 2018-06-14 PROCEDURE — C1751 CATH, INF, PER/CENT/MIDLINE: HCPCS | Performed by: NURSE ANESTHETIST, CERTIFIED REGISTERED

## 2018-06-14 PROCEDURE — 88300 SURGICAL PATH GROSS: CPT | Mod: 26,,, | Performed by: PATHOLOGY

## 2018-06-14 PROCEDURE — 71000033 HC RECOVERY, INTIAL HOUR: Performed by: NEUROLOGICAL SURGERY

## 2018-06-14 PROCEDURE — 63600175 PHARM REV CODE 636 W HCPCS: Performed by: NURSE ANESTHETIST, CERTIFIED REGISTERED

## 2018-06-14 PROCEDURE — 0RG10A0 FUSION OF CERVICAL VERTEBRAL JOINT WITH INTERBODY FUSION DEVICE, ANTERIOR APPROACH, ANTERIOR COLUMN, OPEN APPROACH: ICD-10-PCS | Performed by: NEUROLOGICAL SURGERY

## 2018-06-14 PROCEDURE — S0171 BUMETANIDE 0.5 MG: HCPCS | Performed by: NURSE ANESTHETIST, CERTIFIED REGISTERED

## 2018-06-14 PROCEDURE — 25000003 PHARM REV CODE 250: Performed by: NURSE ANESTHETIST, CERTIFIED REGISTERED

## 2018-06-14 PROCEDURE — 27200677 HC TRANSDUCER MONITOR KIT SINGLE: Performed by: NURSE ANESTHETIST, CERTIFIED REGISTERED

## 2018-06-14 PROCEDURE — 22552 ARTHRD ANT NTRBD CERVICAL EA: CPT | Mod: ,,, | Performed by: NEUROLOGICAL SURGERY

## 2018-06-14 PROCEDURE — 71000039 HC RECOVERY, EACH ADD'L HOUR: Performed by: NEUROLOGICAL SURGERY

## 2018-06-14 PROCEDURE — 22551 ARTHRD ANT NTRBDY CERVICAL: CPT | Mod: 22,,, | Performed by: NEUROLOGICAL SURGERY

## 2018-06-14 PROCEDURE — C1713 ANCHOR/SCREW BN/BN,TIS/BN: HCPCS | Performed by: NEUROLOGICAL SURGERY

## 2018-06-14 DEVICE — IMPLANTABLE DEVICE: Type: IMPLANTABLE DEVICE | Site: NECK | Status: FUNCTIONAL

## 2018-06-14 DEVICE — PUTTY DBX 1CC: Type: IMPLANTABLE DEVICE | Site: SPINE CERVICAL | Status: FUNCTIONAL

## 2018-06-14 DEVICE — IMPLANTABLE DEVICE: Type: IMPLANTABLE DEVICE | Site: SPINE CERVICAL | Status: FUNCTIONAL

## 2018-06-14 DEVICE — SCREW BONE VARIABLE SD 3.6X16: Type: IMPLANTABLE DEVICE | Site: NECK | Status: FUNCTIONAL

## 2018-06-14 DEVICE — SCREW BONE SPINAL 3.6X14MM: Type: IMPLANTABLE DEVICE | Site: NECK | Status: FUNCTIONAL

## 2018-06-14 RX ORDER — CLONIDINE HYDROCHLORIDE 0.1 MG/1
0.1 TABLET ORAL ONCE
Status: COMPLETED | OUTPATIENT
Start: 2018-06-14 | End: 2018-06-14

## 2018-06-14 RX ORDER — ROSUVASTATIN CALCIUM 10 MG/1
10 TABLET, COATED ORAL DAILY
Status: DISCONTINUED | OUTPATIENT
Start: 2018-06-15 | End: 2018-06-16 | Stop reason: HOSPADM

## 2018-06-14 RX ORDER — DEXAMETHASONE SODIUM PHOSPHATE 4 MG/ML
INJECTION, SOLUTION INTRA-ARTICULAR; INTRALESIONAL; INTRAMUSCULAR; INTRAVENOUS; SOFT TISSUE
Status: DISCONTINUED | OUTPATIENT
Start: 2018-06-14 | End: 2018-06-14

## 2018-06-14 RX ORDER — LIDOCAINE HCL/PF 100 MG/5ML
SYRINGE (ML) INTRAVENOUS
Status: DISCONTINUED | OUTPATIENT
Start: 2018-06-14 | End: 2018-06-14

## 2018-06-14 RX ORDER — SODIUM CHLORIDE, SODIUM LACTATE, POTASSIUM CHLORIDE, CALCIUM CHLORIDE 600; 310; 30; 20 MG/100ML; MG/100ML; MG/100ML; MG/100ML
INJECTION, SOLUTION INTRAVENOUS CONTINUOUS
Status: DISCONTINUED | OUTPATIENT
Start: 2018-06-14 | End: 2018-06-14

## 2018-06-14 RX ORDER — HYDRALAZINE HYDROCHLORIDE 20 MG/ML
INJECTION INTRAMUSCULAR; INTRAVENOUS
Status: DISPENSED
Start: 2018-06-14 | End: 2018-06-15

## 2018-06-14 RX ORDER — FENTANYL CITRATE 50 UG/ML
INJECTION, SOLUTION INTRAMUSCULAR; INTRAVENOUS
Status: DISCONTINUED | OUTPATIENT
Start: 2018-06-14 | End: 2018-06-14

## 2018-06-14 RX ORDER — SERTRALINE HYDROCHLORIDE 100 MG/1
100 TABLET, FILM COATED ORAL DAILY
Status: DISCONTINUED | OUTPATIENT
Start: 2018-06-15 | End: 2018-06-16 | Stop reason: HOSPADM

## 2018-06-14 RX ORDER — VANCOMYCIN HYDROCHLORIDE 1 G/20ML
INJECTION, POWDER, LYOPHILIZED, FOR SOLUTION INTRAVENOUS
Status: DISCONTINUED | OUTPATIENT
Start: 2018-06-14 | End: 2018-06-14 | Stop reason: HOSPADM

## 2018-06-14 RX ORDER — ALBUMIN HUMAN 50 G/1000ML
SOLUTION INTRAVENOUS CONTINUOUS PRN
Status: DISCONTINUED | OUTPATIENT
Start: 2018-06-14 | End: 2018-06-14

## 2018-06-14 RX ORDER — SUCCINYLCHOLINE CHLORIDE 20 MG/ML
INJECTION INTRAMUSCULAR; INTRAVENOUS
Status: DISCONTINUED | OUTPATIENT
Start: 2018-06-14 | End: 2018-06-14

## 2018-06-14 RX ORDER — ACETAMINOPHEN 500 MG
1000 TABLET ORAL EVERY 8 HOURS
Status: DISCONTINUED | OUTPATIENT
Start: 2018-06-14 | End: 2018-06-16 | Stop reason: HOSPADM

## 2018-06-14 RX ORDER — ROCURONIUM BROMIDE 10 MG/ML
INJECTION, SOLUTION INTRAVENOUS
Status: DISCONTINUED | OUTPATIENT
Start: 2018-06-14 | End: 2018-06-14

## 2018-06-14 RX ORDER — PROPOFOL 10 MG/ML
VIAL (ML) INTRAVENOUS CONTINUOUS PRN
Status: DISCONTINUED | OUTPATIENT
Start: 2018-06-14 | End: 2018-06-14

## 2018-06-14 RX ORDER — PREGABALIN 75 MG/1
150 CAPSULE ORAL 4 TIMES DAILY
Status: DISCONTINUED | OUTPATIENT
Start: 2018-06-14 | End: 2018-06-16 | Stop reason: HOSPADM

## 2018-06-14 RX ORDER — MAG HYDROX/ALUMINUM HYD/SIMETH 200-200-20
30 SUSPENSION, ORAL (FINAL DOSE FORM) ORAL EVERY 4 HOURS PRN
Status: DISCONTINUED | OUTPATIENT
Start: 2018-06-14 | End: 2018-06-16 | Stop reason: HOSPADM

## 2018-06-14 RX ORDER — BUMETANIDE 0.25 MG/ML
INJECTION INTRAMUSCULAR; INTRAVENOUS
Status: DISCONTINUED | OUTPATIENT
Start: 2018-06-14 | End: 2018-06-14

## 2018-06-14 RX ORDER — EPHEDRINE SULFATE 50 MG/ML
INJECTION, SOLUTION INTRAVENOUS
Status: DISCONTINUED | OUTPATIENT
Start: 2018-06-14 | End: 2018-06-14

## 2018-06-14 RX ORDER — CYCLOBENZAPRINE HCL 5 MG
5 TABLET ORAL 3 TIMES DAILY PRN
Status: DISCONTINUED | OUTPATIENT
Start: 2018-06-14 | End: 2018-06-15

## 2018-06-14 RX ORDER — ONDANSETRON 8 MG/1
8 TABLET, ORALLY DISINTEGRATING ORAL EVERY 6 HOURS PRN
Status: DISCONTINUED | OUTPATIENT
Start: 2018-06-14 | End: 2018-06-16 | Stop reason: HOSPADM

## 2018-06-14 RX ORDER — BACITRACIN 50000 [IU]/1
INJECTION, POWDER, FOR SOLUTION INTRAMUSCULAR
Status: DISCONTINUED | OUTPATIENT
Start: 2018-06-14 | End: 2018-06-14 | Stop reason: HOSPADM

## 2018-06-14 RX ORDER — OXYCODONE HYDROCHLORIDE 5 MG/1
5 TABLET ORAL EVERY 4 HOURS PRN
Status: DISCONTINUED | OUTPATIENT
Start: 2018-06-14 | End: 2018-06-16 | Stop reason: HOSPADM

## 2018-06-14 RX ORDER — IRBESARTAN 150 MG/1
300 TABLET ORAL NIGHTLY
Status: DISCONTINUED | OUTPATIENT
Start: 2018-06-14 | End: 2018-06-16 | Stop reason: HOSPADM

## 2018-06-14 RX ORDER — ACETAMINOPHEN 10 MG/ML
1000 INJECTION, SOLUTION INTRAVENOUS
Status: COMPLETED | OUTPATIENT
Start: 2018-06-14 | End: 2018-06-14

## 2018-06-14 RX ORDER — LIDOCAINE HYDROCHLORIDE 10 MG/ML
1 INJECTION, SOLUTION EPIDURAL; INFILTRATION; INTRACAUDAL; PERINEURAL ONCE
Status: DISCONTINUED | OUTPATIENT
Start: 2018-06-14 | End: 2018-06-14 | Stop reason: HOSPADM

## 2018-06-14 RX ORDER — SODIUM CHLORIDE 9 MG/ML
INJECTION, SOLUTION INTRAVENOUS CONTINUOUS PRN
Status: DISCONTINUED | OUTPATIENT
Start: 2018-06-14 | End: 2018-06-14

## 2018-06-14 RX ORDER — DOXEPIN HYDROCHLORIDE 10 MG/1
10 CAPSULE ORAL NIGHTLY
Status: DISCONTINUED | OUTPATIENT
Start: 2018-06-14 | End: 2018-06-16 | Stop reason: HOSPADM

## 2018-06-14 RX ORDER — CYCLOBENZAPRINE HCL 5 MG
5 TABLET ORAL
Status: COMPLETED | OUTPATIENT
Start: 2018-06-14 | End: 2018-06-14

## 2018-06-14 RX ORDER — MIDAZOLAM HYDROCHLORIDE 1 MG/ML
INJECTION INTRAMUSCULAR; INTRAVENOUS
Status: DISCONTINUED | OUTPATIENT
Start: 2018-06-14 | End: 2018-06-14

## 2018-06-14 RX ORDER — DEXTROSE MONOHYDRATE, SODIUM CHLORIDE, AND POTASSIUM CHLORIDE 50; 1.49; 9 G/1000ML; G/1000ML; G/1000ML
INJECTION, SOLUTION INTRAVENOUS CONTINUOUS
Status: DISCONTINUED | OUTPATIENT
Start: 2018-06-14 | End: 2018-06-15

## 2018-06-14 RX ORDER — HYDRALAZINE HYDROCHLORIDE 20 MG/ML
5 INJECTION INTRAMUSCULAR; INTRAVENOUS ONCE
Status: DISCONTINUED | OUTPATIENT
Start: 2018-06-14 | End: 2018-06-14 | Stop reason: HOSPADM

## 2018-06-14 RX ORDER — OXYCODONE HCL 10 MG/1
10 TABLET, FILM COATED, EXTENDED RELEASE ORAL
Status: COMPLETED | OUTPATIENT
Start: 2018-06-14 | End: 2018-06-14

## 2018-06-14 RX ORDER — AMOXICILLIN 250 MG
2 CAPSULE ORAL NIGHTLY PRN
Status: DISCONTINUED | OUTPATIENT
Start: 2018-06-14 | End: 2018-06-16 | Stop reason: HOSPADM

## 2018-06-14 RX ORDER — ONDANSETRON HYDROCHLORIDE 2 MG/ML
INJECTION, SOLUTION INTRAMUSCULAR; INTRAVENOUS
Status: DISCONTINUED | OUTPATIENT
Start: 2018-06-14 | End: 2018-06-14

## 2018-06-14 RX ORDER — PREGABALIN 50 MG/1
50 CAPSULE ORAL
Status: COMPLETED | OUTPATIENT
Start: 2018-06-14 | End: 2018-06-14

## 2018-06-14 RX ORDER — PROPRANOLOL HYDROCHLORIDE 10 MG/1
10 TABLET ORAL 2 TIMES DAILY PRN
Status: DISCONTINUED | OUTPATIENT
Start: 2018-06-14 | End: 2018-06-16 | Stop reason: HOSPADM

## 2018-06-14 RX ORDER — MUPIROCIN 20 MG/G
1 OINTMENT TOPICAL
Status: COMPLETED | OUTPATIENT
Start: 2018-06-14 | End: 2018-06-14

## 2018-06-14 RX ORDER — HYDROCODONE BITARTRATE AND ACETAMINOPHEN 500; 5 MG/1; MG/1
TABLET ORAL
Status: DISCONTINUED | OUTPATIENT
Start: 2018-06-14 | End: 2018-06-14 | Stop reason: HOSPADM

## 2018-06-14 RX ORDER — BUPIVACAINE HYDROCHLORIDE 2.5 MG/ML
INJECTION, SOLUTION EPIDURAL; INFILTRATION; INTRACAUDAL
Status: DISCONTINUED | OUTPATIENT
Start: 2018-06-14 | End: 2018-06-14 | Stop reason: HOSPADM

## 2018-06-14 RX ORDER — CELECOXIB 100 MG/1
200 CAPSULE ORAL
Status: COMPLETED | OUTPATIENT
Start: 2018-06-14 | End: 2018-06-14

## 2018-06-14 RX ORDER — DULOXETIN HYDROCHLORIDE 30 MG/1
60 CAPSULE, DELAYED RELEASE ORAL DAILY
Status: DISCONTINUED | OUTPATIENT
Start: 2018-06-15 | End: 2018-06-16 | Stop reason: HOSPADM

## 2018-06-14 RX ORDER — MUPIROCIN 20 MG/G
1 OINTMENT TOPICAL 2 TIMES DAILY
Status: DISCONTINUED | OUTPATIENT
Start: 2018-06-14 | End: 2018-06-16 | Stop reason: HOSPADM

## 2018-06-14 RX ORDER — HYDRALAZINE HYDROCHLORIDE 20 MG/ML
INJECTION INTRAMUSCULAR; INTRAVENOUS
Status: COMPLETED
Start: 2018-06-14 | End: 2018-06-14

## 2018-06-14 RX ORDER — HEPARIN SODIUM 5000 [USP'U]/ML
5000 INJECTION, SOLUTION INTRAVENOUS; SUBCUTANEOUS EVERY 8 HOURS
Status: DISCONTINUED | OUTPATIENT
Start: 2018-06-14 | End: 2018-06-16 | Stop reason: HOSPADM

## 2018-06-14 RX ORDER — CELECOXIB 100 MG/1
200 CAPSULE ORAL 2 TIMES DAILY
Status: DISCONTINUED | OUTPATIENT
Start: 2018-06-14 | End: 2018-06-16 | Stop reason: HOSPADM

## 2018-06-14 RX ORDER — TRAMADOL HYDROCHLORIDE 50 MG/1
100 TABLET ORAL EVERY 6 HOURS
Status: DISCONTINUED | OUTPATIENT
Start: 2018-06-14 | End: 2018-06-16 | Stop reason: HOSPADM

## 2018-06-14 RX ORDER — PROPOFOL 10 MG/ML
VIAL (ML) INTRAVENOUS
Status: DISCONTINUED | OUTPATIENT
Start: 2018-06-14 | End: 2018-06-14

## 2018-06-14 RX ORDER — HYDRALAZINE HYDROCHLORIDE 20 MG/ML
15 INJECTION INTRAMUSCULAR; INTRAVENOUS ONCE
Status: COMPLETED | OUTPATIENT
Start: 2018-06-14 | End: 2018-06-14

## 2018-06-14 RX ADMIN — EPHEDRINE SULFATE 20 MG: 50 INJECTION, SOLUTION INTRAMUSCULAR; INTRAVENOUS; SUBCUTANEOUS at 11:06

## 2018-06-14 RX ADMIN — ACETAMINOPHEN 1000 MG: 10 INJECTION, SOLUTION INTRAVENOUS at 08:06

## 2018-06-14 RX ADMIN — SODIUM CHLORIDE, SODIUM LACTATE, POTASSIUM CHLORIDE, AND CALCIUM CHLORIDE: .6; .31; .03; .02 INJECTION, SOLUTION INTRAVENOUS at 12:06

## 2018-06-14 RX ADMIN — CELECOXIB 200 MG: 100 CAPSULE ORAL at 08:06

## 2018-06-14 RX ADMIN — KETAMINE HYDROCHLORIDE 5 MCG/KG/MIN: 50 INJECTION, SOLUTION, CONCENTRATE INTRAMUSCULAR; INTRAVENOUS at 02:06

## 2018-06-14 RX ADMIN — IRBESARTAN 300 MG: 150 TABLET ORAL at 08:06

## 2018-06-14 RX ADMIN — ROCURONIUM BROMIDE 5 MG: 10 INJECTION, SOLUTION INTRAVENOUS at 11:06

## 2018-06-14 RX ADMIN — SODIUM CHLORIDE, SODIUM LACTATE, POTASSIUM CHLORIDE, AND CALCIUM CHLORIDE: .6; .31; .03; .02 INJECTION, SOLUTION INTRAVENOUS at 11:06

## 2018-06-14 RX ADMIN — DEXTROSE MONOHYDRATE, SODIUM CHLORIDE, AND POTASSIUM CHLORIDE: 50; 9; 1.49 INJECTION, SOLUTION INTRAVENOUS at 08:06

## 2018-06-14 RX ADMIN — MUPIROCIN 1 G: 20 OINTMENT TOPICAL at 08:06

## 2018-06-14 RX ADMIN — OXYCODONE HYDROCHLORIDE 10 MG: 10 TABLET, FILM COATED, EXTENDED RELEASE ORAL at 08:06

## 2018-06-14 RX ADMIN — SODIUM CHLORIDE: 0.9 INJECTION, SOLUTION INTRAVENOUS at 11:06

## 2018-06-14 RX ADMIN — SUCCINYLCHOLINE CHLORIDE 120 MG: 20 INJECTION, SOLUTION INTRAMUSCULAR; INTRAVENOUS at 11:06

## 2018-06-14 RX ADMIN — OXYCODONE HYDROCHLORIDE 5 MG: 5 TABLET ORAL at 05:06

## 2018-06-14 RX ADMIN — TRAMADOL HYDROCHLORIDE 100 MG: 50 TABLET, COATED ORAL at 11:06

## 2018-06-14 RX ADMIN — HEPARIN SODIUM 5000 UNITS: 5000 INJECTION, SOLUTION INTRAVENOUS; SUBCUTANEOUS at 10:06

## 2018-06-14 RX ADMIN — HYDRALAZINE HYDROCHLORIDE 15 MG: 20 INJECTION INTRAMUSCULAR; INTRAVENOUS at 05:06

## 2018-06-14 RX ADMIN — PROPOFOL 200 MG: 10 INJECTION, EMULSION INTRAVENOUS at 11:06

## 2018-06-14 RX ADMIN — ALBUMIN (HUMAN): 2.5 SOLUTION INTRAVENOUS at 01:06

## 2018-06-14 RX ADMIN — CYCLOBENZAPRINE HYDROCHLORIDE 5 MG: 5 TABLET, FILM COATED ORAL at 08:06

## 2018-06-14 RX ADMIN — CYCLOBENZAPRINE HYDROCHLORIDE 5 MG: 5 TABLET, FILM COATED ORAL at 06:06

## 2018-06-14 RX ADMIN — ONDANSETRON 8 MG: 2 INJECTION, SOLUTION INTRAMUSCULAR; INTRAVENOUS at 03:06

## 2018-06-14 RX ADMIN — FENTANYL CITRATE 50 MCG: 50 INJECTION, SOLUTION INTRAMUSCULAR; INTRAVENOUS at 12:06

## 2018-06-14 RX ADMIN — TRAMADOL HYDROCHLORIDE 100 MG: 50 TABLET, COATED ORAL at 06:06

## 2018-06-14 RX ADMIN — DOXEPIN HYDROCHLORIDE 10 MG: 10 CAPSULE ORAL at 10:06

## 2018-06-14 RX ADMIN — MIDAZOLAM HYDROCHLORIDE 4 MG: 1 INJECTION, SOLUTION INTRAMUSCULAR; INTRAVENOUS at 11:06

## 2018-06-14 RX ADMIN — ACETAMINOPHEN 1000 MG: 500 TABLET, FILM COATED ORAL at 10:06

## 2018-06-14 RX ADMIN — LIDOCAINE HYDROCHLORIDE 80 MG: 20 INJECTION, SOLUTION INTRAVENOUS at 11:06

## 2018-06-14 RX ADMIN — PREGABALIN 150 MG: 75 CAPSULE ORAL at 08:06

## 2018-06-14 RX ADMIN — PROPOFOL 130 MCG/KG/MIN: 10 INJECTION, EMULSION INTRAVENOUS at 12:06

## 2018-06-14 RX ADMIN — FENTANYL CITRATE 150 MCG: 50 INJECTION, SOLUTION INTRAMUSCULAR; INTRAVENOUS at 11:06

## 2018-06-14 RX ADMIN — REMIFENTANIL HYDROCHLORIDE 0.02 MCG/KG/MIN: 1 INJECTION, POWDER, LYOPHILIZED, FOR SOLUTION INTRAVENOUS at 11:06

## 2018-06-14 RX ADMIN — PREGABALIN 50 MG: 50 CAPSULE ORAL at 08:06

## 2018-06-14 RX ADMIN — CLONIDINE HYDROCHLORIDE 0.1 MG: 0.1 TABLET ORAL at 11:06

## 2018-06-14 RX ADMIN — OXYCODONE HYDROCHLORIDE 5 MG: 5 TABLET ORAL at 08:06

## 2018-06-14 RX ADMIN — DEXAMETHASONE SODIUM PHOSPHATE 4 MG: 4 INJECTION, SOLUTION INTRAMUSCULAR; INTRAVENOUS at 03:06

## 2018-06-14 RX ADMIN — BUMETANIDE 1 MG: 0.25 INJECTION, SOLUTION INTRAMUSCULAR; INTRAVENOUS at 02:06

## 2018-06-14 RX ADMIN — PROPOFOL 125 MCG/KG/MIN: 10 INJECTION, EMULSION INTRAVENOUS at 11:06

## 2018-06-14 RX ADMIN — KETAMINE HYDROCHLORIDE 5 MCG/KG/MIN: 50 INJECTION, SOLUTION, CONCENTRATE INTRAMUSCULAR; INTRAVENOUS at 11:06

## 2018-06-14 RX ADMIN — Medication 1 MG: at 11:06

## 2018-06-14 RX ADMIN — ALBUMIN (HUMAN): 2.5 SOLUTION INTRAVENOUS at 02:06

## 2018-06-14 RX ADMIN — GENTAMICIN SULFATE 80 MG: 40 INJECTION, SOLUTION INTRAMUSCULAR; INTRAVENOUS at 12:06

## 2018-06-14 RX ADMIN — FENTANYL CITRATE 50 MCG: 50 INJECTION, SOLUTION INTRAMUSCULAR; INTRAVENOUS at 11:06

## 2018-06-14 RX ADMIN — EPHEDRINE SULFATE 10 MG: 50 INJECTION, SOLUTION INTRAMUSCULAR; INTRAVENOUS; SUBCUTANEOUS at 11:06

## 2018-06-14 NOTE — H&P
NEUROSURGICAL H&P NOTE     DATE OF SERVICE:  06/14/2018     ATTENDING PHYSICIAN:  Oumar Guerrero MD     CONSULT REQUESTED BY:  HIRAL Rios MD     REASON FOR CONSULT:  Neck pain, bilateral hands weakness and numbness     SUBJECTIVE:     HISTORY OF PRESENT ILLNESS:  This is a very pleasant 67 y.o. female who had a C5-6 and C6-7 ACDF with anterior plating before 2005. She reports worsening neck pain, difficulty writing, picking up objects for more than 2 years. Numbness is felt in the bilateral C8 distribution. She also has bilateral UE pain. Gait has been worsening. She uses a cane. Complains of chronic neck pain that is aggravated by rotation and extension. She also has left knee pain.         Neck Disability  Neck Disability-Pain Score: 5  Neck Disability-Pain Intensity: The pain is fairly severe at the moment  Neck Disability-Personal Care: I can look after myself but it causes extra pain  Neck Disability-Lifting: I can lift very light weights  Neck Disability-Reading: I can read as much as I want to, with moderate pain in my neck  Neck Disability-Headaches: I have no headaches at all  Neck Disability-Concentration: I have a fair degree of difficulty in concentratng when I want to  Neck Disability-work: I can do my usual work, but no more  Neck Disability-Driving: I can drive my car as long as I want with slight pain in my neck  Neck Disability-Sleeping: My sleep is completely disturbed (5-7 hours sleeplessness)  Neck Disability-Recreation: I cant do any recreation activities at all     PAST MEDICAL HISTORY:       Active Ambulatory Problems     Diagnosis Date Noted    PTSD (post-traumatic stress disorder) 12/19/2015    Left knee pain 12/19/2015    Arthritis 12/19/2015    SOB (shortness of breath) 12/19/2015    Known medical problems 07/22/2016    Seasonal allergies 09/26/2016    Depression 11/08/2016    Primary insomnia 01/23/2017    Postherpetic neuralgia 03/28/2017    Anxiety 05/07/2017    Essential  hypertension 05/07/2017    Chronic pain syndrome 05/07/2017    Hypercholesteremia 05/07/2017    Vitamin D deficiency 05/07/2017    Use of cane as ambulatory aid 05/07/2017           Resolved Ambulatory Problems     Diagnosis Date Noted    No Resolved Ambulatory Problems           Past Medical History:   Diagnosis Date    Anxiety 5/7/2017    Arthritis 12/19/2015    Chronic pain syndrome 5/7/2017    Essential hypertension 5/7/2017    Hypercholesteremia 5/7/2017    Left knee pain 12/19/2015    SOB (shortness of breath) 12/19/2015    Spinal stenosis      Use of cane as ambulatory aid 5/7/2017    Vitamin D deficiency 5/7/2017         PAST SURGICAL HISTORY:        Past Surgical History:   Procedure Laterality Date    BREAST SURGERY        CYST REMOVAL        FRACTURE SURGERY         bone fragments removed    HYSTERECTOMY        TONSILLECTOMY        TOTAL REDUCTION MAMMOPLASTY             SOCIAL HISTORY:   Social History   Social History            Social History    Marital status:        Spouse name: N/A    Number of children: N/A    Years of education: N/A          Occupational History    Not on file.           Social History Main Topics    Smoking status: Never Smoker    Smokeless tobacco: Never Used    Alcohol use No    Drug use: No    Sexual activity: Not on file           Other Topics Concern    Not on file          Social History Narrative    No narrative on file            FAMILY HISTORY:  Family History   Problem Relation Age of Onset    Ulcers Mother      Heart attack Father      Breast cancer Sister      Diabetes Brother      Kidney failure Brother      No Known Problems Daughter      Post-traumatic stress disorder Son      Cirrhosis Brother      Hepatitis Brother         C    No Known Problems Maternal Aunt      No Known Problems Maternal Uncle      No Known Problems Paternal Aunt      No Known Problems Paternal Uncle      No Known Problems Maternal  Grandmother      No Known Problems Maternal Grandfather      No Known Problems Paternal Grandmother      No Known Problems Paternal Grandfather      Amblyopia Neg Hx      Blindness Neg Hx      Cancer Neg Hx      Cataracts Neg Hx      Glaucoma Neg Hx      Hypertension Neg Hx      Macular degeneration Neg Hx      Retinal detachment Neg Hx      Strabismus Neg Hx      Stroke Neg Hx      Thyroid disease Neg Hx           CURRENTS MEDICATIONS:         Current Outpatient Prescriptions on File Prior to Visit   Medication Sig Dispense Refill    baclofen (LIORESAL) 20 MG tablet TAKE 1/2 TO 1 TABLET BY MOUTH THREE TIMES A DAY AS NEEDED (Patient taking differently: Take 1/2 pill three times a day) 90 tablet 0    doxepin (SINEQUAN) 10 MG capsule TAKE 1-2 CAPSULES BY MOUTH AT BEDTIME 180 capsule 0    duloxetine (CYMBALTA) 60 MG capsule TAKE 1 CAPSULE BY MOUTH DAILY 90 capsule 0    irbesartan (AVAPRO) 300 MG tablet Take 1 tablet (300 mg total) by mouth every evening. 90 tablet 1    levocetirizine (XYZAL) 5 MG tablet TAKE 1 TABLET BY MOUTH DAILY EVERY EVENING 30 tablet 2    LYRICA 150 mg capsule TAKE 1 CAPSULE BY MOUTH FOUR TIMES A  capsule 2    meloxicam (MOBIC) 15 MG tablet TAKE 1 TABLET BY MOUTH DAILY 90 tablet 1    propranolol (INDERAL) 10 MG tablet Take 1 tablet (10 mg total) by mouth 2 (two) times daily as needed. tremors 60 tablet 5    rosuvastatin (CRESTOR) 10 MG tablet Take 1 tablet (10 mg total) by mouth once daily. For cholesterol 90 tablet 3    sertraline (ZOLOFT) 50 MG tablet Take two tablet daily 60 tablet 2    temazepam (RESTORIL) 30 mg capsule TAKE 1 CAPSULE BY MOUTH AT BEDTIME AS NEEDED FOR INSOMNIA 30 capsule 5    traMADol (ULTRAM) 50 mg tablet Take 1 tablet (50 mg total) by mouth 3 (three) times daily as needed. 270 tablet 1      No current facility-administered medications on file prior to visit.          ALLERGIES:        Review of patient's allergies indicates:   Allergen  "Reactions    Percodan [oxycodone hcl-oxycodone-asa]         Pt reports can take Ultram but perocdan makes her feel foggy    Phenothiazines         "Itchy, out of sorts, difficult to thing"    Sulfa (sulfonamide antibiotics)         Hives         REVIEW OF SYSTEMS:  Review of Systems   Constitutional: Negative for diaphoresis, fever and weight loss.   Respiratory: Negative for shortness of breath.    Cardiovascular: Negative for chest pain.   Gastrointestinal: Negative for blood in stool.   Genitourinary: Negative for hematuria.   Endo/Heme/Allergies: Does not bruise/bleed easily.   All other systems reviewed and are negative.        OBJECTIVE:     PHYSICAL EXAMINATION:       Vitals:     03/12/18 0918   BP: (!) 161/86   Pulse: 80         Physical Exam:  Vitals reviewed.     Constitutional: She appears well-developed and well-nourished.      Eyes: Pupils are equal, round, and reactive to light. Conjunctivae and EOM are normal.      Cardiovascular: Normal distal pulses and no edema.      Abdominal: Soft.      Skin: Skin displays no rash on trunk and no rash on extremities. Skin displays no lesions on trunk and no lesions on extremities.     Psych/Behavior: She is alert. She is oriented to person, place, and time. She has a normal mood and affect.     Musculoskeletal:        Neck: Range of motion is limited. ROM severity is 45 degree bilaterally.     Neurological:        Sensory:   Touch decreased in the C8 distribution bilaterally       DTRs: Tricep reflexes are 3+ on the right side and 3+ on the left side. Bicep reflexes are 3+ on the right side and 3+ on the left side. Brachioradialis reflexes are 3+ on the right side and 3+ on the left side. Patellar reflexes are 1+ on the right side and 2+ on the left side. Achilles reflexes are 0 on the right side and 0 on the left side.         Back Exam      Tenderness   The patient is experiencing tenderness in the cervical.     Muscle Strength   Right Quadriceps:  5/5 "   Left Quadriceps:  5/5   Right Hamstrings:  5/5   Left Hamstrings:  5/5                  Neurologic Exam      Mental Status   Oriented to person, place, and time.   Speech: speech is normal   Level of consciousness: alert     Cranial Nerves   Cranial nerves II through XII intact.      CN III, IV, VI   Pupils are equal, round, and reactive to light.  Extraocular motions are normal.      Motor Exam   Muscle bulk: normal  Overall muscle tone: normal     Strength   Right deltoid: 5/5  Left deltoid: 5/5  Right biceps: 5/5  Left biceps: 5/5  Right triceps: 5/5  Left triceps: 5/5  Right wrist flexion: 5/5  Left wrist flexion: 5/5  Right wrist extension: 5/5  Left wrist extension: 5/5  Right interossei: 4/5  Left interossei: 4/5  Right iliopsoas: 5/5  Left iliopsoas: 5/5  Right quadriceps: 5/5  Left quadriceps: 5/5  Right hamstrin/5  Left hamstrin/5  Right anterior tibial: 5/5  Left anterior tibial: 5/5  Right posterior tibial: 5/5  Left posterior tibial: 5/5  Right peroneal: 5/5  Left peroneal: 5/5  Right gastroc: 5/5  Left gastroc: 5/5     Sensory Exam   Touch decreased in the C8 distribution bilaterally      Gait, Coordination, and Reflexes      Gait  Gait: (antalgic)     Coordination   Finger to nose coordination: normal  Tandem walking coordination: abnormal     Reflexes   Right brachioradialis: 3+  Left brachioradialis: 3+  Right biceps: 3+  Left biceps: 3+  Right triceps: 3+  Left triceps: 3+  Right patellar: 1+  Left patellar: 2+  Right achilles: 0  Left achilles: 0  Right plantar: normal  Left plantar: normal  Right Darby: absent  Left Darby: absent  Right ankle clonus: absent  Left ankle clonus: absent           DIAGNOSTIC DATA:  I personally reviewed the following imaging:   Cervical spine MRI 2018: C5-6 and C6-7 anterior fusion with plating. C6-7 spondylosis with left foraminal stenosis, C7-T1 stenosis with severe bilateral  foraminal stenosis     ASSESMENT:  This is a 67 y.o. female with           Problem List Items Addressed This Visit      None               Visit Diagnoses      S/P cervical spinal fusion    -  Primary     Relevant Orders     X-Ray Cervical Spine AP And Lateral     Pseudoarthrosis of cervical spine, initial encounter         Relevant Orders     X-Ray Cervical Spine AP And Lateral     Cervical spondylosis with myelopathy and radiculopathy         Relevant Orders     X-Ray Cervical Spine AP And Lateral             PLAN:  I explained the natural history of the disease and all treatment options. I recommended a removal of anterior cervical plate and screws, revision of C6-7 anterior cervical discectomy and instrumented fusion, C7-T1 anterior cervical discectomy and instrumented fusion to prevent worsening of the radiculopathy and  myelopathy.      We have discussed the risks of surgery including bleeding, infection, failure of surgery, CSF leak, nerve root injury, spinal cord injury, weakness, paralysis, peripheral neuropathy, malplaced hardware, migration of hardware, non-union, need for reoperation. Patient understands the risks and would like to proceed with surgery.        The patient has increased perioperative risks because of these comorbidities: prior cervical fusion.           Oumar Guerrero MD  Pager: [phone]

## 2018-06-14 NOTE — ANESTHESIA POSTPROCEDURE EVALUATION
"Anesthesia Post Evaluation    Patient: Marie GERMAIN Cano    Procedure(s) Performed: Procedure(s) (LRB):  HARDWARE REMOVAL- ANTERIOR CERVICAL Removal of Crevical plate and screws. Revision of C6-7 Arthodesis C7-T1 Anterior Disectomy foraminotomy, Arthodesis instrumentation (N/A)    Final Anesthesia Type: general  Patient location during evaluation: PACU  Patient participation: Yes- Able to Participate  Level of consciousness: awake and alert  Post-procedure vital signs: reviewed and stable  Pain management: adequate  Airway patency: patent  PONV status at discharge: No PONV  Anesthetic complications: no      Cardiovascular status: blood pressure returned to baseline  Respiratory status: unassisted  Hydration status: euvolemic  Follow-up not needed.        Visit Vitals  BP (!) 178/85   Pulse 84   Temp 36.3 °C (97.3 °F) (Skin)   Resp 20   Ht 5' 4" (1.626 m)   Wt 118.8 kg (262 lb)   SpO2 98%   Breastfeeding? No   BMI 44.97 kg/m²       Pain/Fili Score: Pain Assessment Performed: Yes (6/14/2018  5:20 PM)  Presence of Pain: complains of pain/discomfort (6/14/2018  5:20 PM)  Pain Rating Prior to Med Admin: 5 (6/14/2018  6:16 PM)  Fili Score: 9 (6/14/2018  5:10 PM)      "

## 2018-06-14 NOTE — CHAPLAIN
Visited with patient and daughter prior to surgery.  Patient completed an advance directive and assigned her daughter Katharina as her health care POA.  Patient appeared anxious but was fully aware of the advance directives procedure. I did provide prayer support and encouragement.  I am available if patient or daughter request follow up visit.

## 2018-06-14 NOTE — TRANSFER OF CARE
"Anesthesia Transfer of Care Note    Patient: Marie Cano    Procedure(s) Performed: Procedure(s) (LRB):  HARDWARE REMOVAL- ANTERIOR CERVICAL Removal of Crevical plate and screws. Revision of C6-7 Arthodesis C7-T1 Anterior Disectomy foraminotomy, Arthodesis instrumentation (N/A)    Patient location: PACU    Anesthesia Type: general    Transport from OR: Upon arrival to PACU/ICU, patient attached to ventilator and auscultated to confirm bilateral breath sounds and adequate TV. Continuous ECG monitoring in transport. Continuous SpO2 monitoring in transport    Post pain: adequate analgesia    Post assessment: no apparent anesthetic complications    Post vital signs: stable    Level of consciousness: sedated    Nausea/Vomiting: no nausea/vomiting    Complications: none    Transfer of care protocol was followedComments: Report given to IVY, PACU RN      Last vitals:   Visit Vitals  BP (!) 175/84   Pulse 72   Temp 36.3 °C (97.3 °F) (Skin)   Resp 16   Ht 5' 4" (1.626 m)   Wt 118.8 kg (262 lb)   SpO2 100%   Breastfeeding? No   BMI 44.97 kg/m²     "

## 2018-06-14 NOTE — BRIEF OP NOTE
[facility]  Neurosurgery  Operative Note    SUMMARY      Date of Procedure: 6/14/2018     Procedure: Procedure(s) (LRB):  HARDWARE REMOVAL- ANTERIOR CERVICAL Removal of Crevical plate and screws. Revision of C6-7 Arthodesis C7-T1 Anterior Disectomy foraminotomy, Arthodesis instrumentation (N/A)     Surgeon(s) and Role:     * Oumar Guerrero MD - Primary    Assisting Surgeon: None    Pre-Operative Diagnosis: Cervical spondylosis with myelopathy [M47.12]  Arthrodesis status [Z98.1]  Cervical spondylosis with radiculopathy [M47.22]  Closed fracture of first cervical vertebra without spinal cord injury with routine healing, subsequent encounter [S12.000D]    Post-Operative Diagnosis: Post-Op Diagnosis Codes:     * Cervical spondylosis with myelopathy [M47.12]     * Arthrodesis status [Z98.1]     * Cervical spondylosis with radiculopathy [M47.22]     * Closed fracture of first cervical vertebra without spinal cord injury with routine healing, subsequent encounter [S12.000D]    Anesthesia: General    Technical Procedures Used: Removal of anterior cervical instrumentation, C5-6 and C6-7 anterior discectomy and instrumented fusion    Description of the Findings of the Procedure: See full op note      Significant Surgical Tasks Conducted by the Assistant(s), if Applicable: NA    Complications: No    Estimated Blood Loss (EBL): 350 mL           Specimens:   Specimen (12h ago through future)    None           Implants:   Implant Name Type Inv. Item Serial No.  Lot No. LRB No. Used   [id number]   [id number] GLOBUS [id number] N/A 1   [id number]  PUTTY DBX 1CC [id number] MUSCULOSKELETAL TRANSPLANT FND  N/A 1   COALITION SPACER      N/A 1   SPACER SPINAL 7 DEG [id number] - [id number]  SPACER SPINAL 7 DEG [id number]  GLOBUS  N/A 1             SCREW BONE SPINAL 3.6X14MM - [id number]   SCREW BONE SPINAL 3.6X14MM   GLOBUS   N/A 1              Condition: Good    Disposition: PACU -  hemodynamically stable.    Attestation: I was present and scrubbed for the entire procedure.

## 2018-06-14 NOTE — ANESTHESIA PROCEDURE NOTES
Arterial    Diagnosis: neck pain    Patient location during procedure: done in OR  Procedure start time: 6/14/2018 12:04 PM  Timeout: 6/14/2018 12:04 PM  Procedure end time: 6/14/2018 12:06 PM  Staffing  Anesthesiologist: TRACY GARCIA  Resident/CRNA: MEI ANGULO  Performed: other anesthesia staff   Preanesthetic Checklist  Completed: patient identified, site marked, surgical consent, pre-op evaluation, timeout performed, IV checked, risks and benefits discussed, monitors and equipment checked and anesthesia consent givenArterial  Skin Prep: chlorhexidine gluconate  Local Infiltration: none  Orientation: right  Location: radial  Catheter Size: 20 G  Catheter placement by Anatomical landmarks. Heme positive aspiration all ports.Insertion Attempts: 1  Assessment  Dressing: secured with tape and tegaderm  Patient: Tolerated well  Additional Notes  Placed by DAIN Devi after induction

## 2018-06-15 PROCEDURE — G8979 MOBILITY GOAL STATUS: HCPCS | Mod: CI

## 2018-06-15 PROCEDURE — 27000221 HC OXYGEN, UP TO 24 HOURS

## 2018-06-15 PROCEDURE — 94761 N-INVAS EAR/PLS OXIMETRY MLT: CPT

## 2018-06-15 PROCEDURE — 99024 POSTOP FOLLOW-UP VISIT: CPT | Mod: POP,,, | Performed by: PHYSICIAN ASSISTANT

## 2018-06-15 PROCEDURE — 11000001 HC ACUTE MED/SURG PRIVATE ROOM

## 2018-06-15 PROCEDURE — G8978 MOBILITY CURRENT STATUS: HCPCS | Mod: CK

## 2018-06-15 PROCEDURE — 97161 PT EVAL LOW COMPLEX 20 MIN: CPT

## 2018-06-15 PROCEDURE — 97116 GAIT TRAINING THERAPY: CPT

## 2018-06-15 PROCEDURE — 94799 UNLISTED PULMONARY SVC/PX: CPT

## 2018-06-15 PROCEDURE — 63600175 PHARM REV CODE 636 W HCPCS: Performed by: PHYSICIAN ASSISTANT

## 2018-06-15 PROCEDURE — 25000003 PHARM REV CODE 250: Performed by: PHYSICIAN ASSISTANT

## 2018-06-15 RX ORDER — CELECOXIB 200 MG/1
200 CAPSULE ORAL 2 TIMES DAILY PRN
Qty: 60 CAPSULE | Refills: 0 | Status: SHIPPED | OUTPATIENT
Start: 2018-06-15

## 2018-06-15 RX ORDER — OXYCODONE HYDROCHLORIDE 5 MG/1
5 TABLET ORAL EVERY 6 HOURS PRN
Qty: 20 TABLET | Refills: 0 | Status: SHIPPED | OUTPATIENT
Start: 2018-06-15 | End: 2018-11-21

## 2018-06-15 RX ORDER — CYCLOBENZAPRINE HCL 5 MG
5 TABLET ORAL 3 TIMES DAILY
Status: DISCONTINUED | OUTPATIENT
Start: 2018-06-15 | End: 2018-06-16 | Stop reason: HOSPADM

## 2018-06-15 RX ORDER — AMOXICILLIN 250 MG
2 CAPSULE ORAL NIGHTLY PRN
COMMUNITY
Start: 2018-06-15 | End: 2018-11-21

## 2018-06-15 RX ORDER — TIZANIDINE 4 MG/1
4 TABLET ORAL 3 TIMES DAILY PRN
Qty: 60 TABLET | Refills: 0 | Status: SHIPPED | OUTPATIENT
Start: 2018-06-15 | End: 2018-07-05

## 2018-06-15 RX ORDER — TRAMADOL HYDROCHLORIDE 50 MG/1
TABLET ORAL
Qty: 60 TABLET | Refills: 0 | Status: SHIPPED | OUTPATIENT
Start: 2018-06-15 | End: 2018-12-07 | Stop reason: SDUPTHER

## 2018-06-15 RX ADMIN — MUPIROCIN 1 G: 20 OINTMENT TOPICAL at 09:06

## 2018-06-15 RX ADMIN — CELECOXIB 200 MG: 100 CAPSULE ORAL at 09:06

## 2018-06-15 RX ADMIN — PREGABALIN 150 MG: 75 CAPSULE ORAL at 07:06

## 2018-06-15 RX ADMIN — ACETAMINOPHEN 1000 MG: 500 TABLET, FILM COATED ORAL at 09:06

## 2018-06-15 RX ADMIN — CYCLOBENZAPRINE HYDROCHLORIDE 5 MG: 5 TABLET, FILM COATED ORAL at 03:06

## 2018-06-15 RX ADMIN — TRAMADOL HYDROCHLORIDE 100 MG: 50 TABLET, COATED ORAL at 11:06

## 2018-06-15 RX ADMIN — PREGABALIN 150 MG: 75 CAPSULE ORAL at 09:06

## 2018-06-15 RX ADMIN — TRAMADOL HYDROCHLORIDE 100 MG: 50 TABLET, COATED ORAL at 07:06

## 2018-06-15 RX ADMIN — HEPARIN SODIUM 5000 UNITS: 5000 INJECTION, SOLUTION INTRAVENOUS; SUBCUTANEOUS at 07:06

## 2018-06-15 RX ADMIN — OXYCODONE HYDROCHLORIDE 5 MG: 5 TABLET ORAL at 03:06

## 2018-06-15 RX ADMIN — PREGABALIN 150 MG: 75 CAPSULE ORAL at 11:06

## 2018-06-15 RX ADMIN — HEPARIN SODIUM 5000 UNITS: 5000 INJECTION, SOLUTION INTRAVENOUS; SUBCUTANEOUS at 03:06

## 2018-06-15 RX ADMIN — PREGABALIN 150 MG: 75 CAPSULE ORAL at 12:06

## 2018-06-15 RX ADMIN — OXYCODONE HYDROCHLORIDE 5 MG: 5 TABLET ORAL at 09:06

## 2018-06-15 RX ADMIN — HEPARIN SODIUM 5000 UNITS: 5000 INJECTION, SOLUTION INTRAVENOUS; SUBCUTANEOUS at 09:06

## 2018-06-15 RX ADMIN — TRAMADOL HYDROCHLORIDE 100 MG: 50 TABLET, COATED ORAL at 12:06

## 2018-06-15 RX ADMIN — DULOXETINE 60 MG: 30 CAPSULE, DELAYED RELEASE ORAL at 09:06

## 2018-06-15 RX ADMIN — OXYCODONE HYDROCHLORIDE 5 MG: 5 TABLET ORAL at 07:06

## 2018-06-15 RX ADMIN — IRBESARTAN 300 MG: 150 TABLET ORAL at 09:06

## 2018-06-15 RX ADMIN — ROSUVASTATIN CALCIUM 10 MG: 10 TABLET, FILM COATED ORAL at 09:06

## 2018-06-15 RX ADMIN — ACETAMINOPHEN 1000 MG: 500 TABLET, FILM COATED ORAL at 03:06

## 2018-06-15 RX ADMIN — TRAMADOL HYDROCHLORIDE 100 MG: 50 TABLET, COATED ORAL at 06:06

## 2018-06-15 RX ADMIN — ACETAMINOPHEN 1000 MG: 500 TABLET, FILM COATED ORAL at 06:06

## 2018-06-15 RX ADMIN — DEXTROSE MONOHYDRATE, SODIUM CHLORIDE, AND POTASSIUM CHLORIDE: 50; 9; 1.49 INJECTION, SOLUTION INTRAVENOUS at 06:06

## 2018-06-15 RX ADMIN — SERTRALINE HYDROCHLORIDE 100 MG: 100 TABLET ORAL at 09:06

## 2018-06-15 RX ADMIN — CYCLOBENZAPRINE HYDROCHLORIDE 5 MG: 5 TABLET, FILM COATED ORAL at 09:06

## 2018-06-15 RX ADMIN — DOXEPIN HYDROCHLORIDE 10 MG: 10 CAPSULE ORAL at 09:06

## 2018-06-15 NOTE — PT/OT/SLP EVAL
Physical Therapy Evaluation    Patient Name:  Marie Cano   MRN:  [id number]    Recommendations:     Discharge Recommendations:    Home with Home Health  Discharge Equipment Recommendations: bedside commode   Barriers to discharge: None    Assessment:     Marie Cano is a 67 y.o. female admitted with a medical diagnosis of Cervical spondylosis with myelopathy and radiculopathy.  She presents with the following impairments/functional limitations:  weakness, gait instability, impaired balance, impaired endurance, impaired self care skills, impaired functional mobilty, pain, decreased ROM, edema, impaired skin . Patient able to use RW with CG to min assist ~ 12 ft total .    Rehab Prognosis:  good; patient would benefit from acute skilled PT services to address these deficits and reach maximum level of function.      Recent Surgery: Procedure(s) (LRB):  HARDWARE REMOVAL- ANTERIOR CERVICAL Removal of Crevical plate and screws. Revision of C6-7 Arthodesis C7-T1 Anterior Disectomy foraminotomy, Arthodesis instrumentation (N/A) 1 Day Post-Op    Plan:     During this hospitalization, patient to be seen daily to address the above listed problems via gait training, therapeutic activities, therapeutic exercises  · Plan of Care Expires:  07/15/18   Plan of Care Reviewed with: patient, daughter    Subjective     Communicated with primary nurse prior to session.  Patient found supine  upon PT entry to room, agreeable to evaluation.      Chief Complaint: pain   Patient comments/goals: go home    Pain/Comfort:  · Pain Rating 1: other (see comments) (did not rate on scale)  · Location - Side 1: Bilateral  · Location - Orientation 1: generalized  · Location 1: neck    Patients cultural, spiritual, Yazdanism conflicts given the current situation:      Living Environment:  Lives with brother [facility] no concerns  Prior to admission, patients level of function was independent.  Patient has the following equipment: cane, straight, shower  chair, walker, rolling.  DME owned (not currently used): rolling walker.  Upon discharge, patient will have assistance from family  .    Objective:     Patient found with: ANDRE drain, peripheral IV, telemetry     General Precautions: Standard, fall   Orthopedic Precautions:N/A   Braces: Aspen collar     Exams:  · RLE ROM: WFL  · RLE Strength: WFL  · LLE ROM: WFL  · LLE Strength: WFL    Functional Mobility:  · Bed Mobility:     · Supine to Sit: minimum assistance and moderate assistance  · Sit to Supine: minimum assistance and moderate assistance  · Transfers:     · Sit to Stand:  contact guard assistance with rolling walker  · Gait: CG to min assist RW ~ 12 ft   · Balance: poor with RW    AM-PAC 6 CLICK MOBILITY  Total Score:16       Therapeutic Activities and Exercises:   na    Patient left HOB elevated with call button in reach and family present.    GOALS:    Physical Therapy Goals        Problem: Physical Therapy Goal    Goal Priority Disciplines Outcome Goal Variances Interventions   Physical Therapy Goal     PT/OT, PT Ongoing (interventions implemented as appropriate)     Description:  Goals to be met by: 7/15/2018     Patient will increase functional independence with mobility by performin. Supine to sit with Modified [place]  2. Sit to stand transfer with Modified [place]  3. Gait  x 50 feet with Supervision using Rolling Walker.                       History:     Past Medical History:   Diagnosis Date    Anxiety 2017    Arthritis 2015    Chronic pain syndrome 2017    Essential hypertension 2017    Hypercholesteremia 2017    Left knee pain 2015    SOB (shortness of breath) 2015    Spinal stenosis     Use of cane as ambulatory aid 2017    Vitamin D deficiency 2017       Past Surgical History:   Procedure Laterality Date    BREAST SURGERY      CYST REMOVAL      FRACTURE SURGERY      bone fragments removed, leg    HYSTERECTOMY      REMOVAL  OF HARDWARE FROM ANTERIOR CERVICAL SPINE N/A 6/14/2018    Procedure: HARDWARE REMOVAL- ANTERIOR CERVICAL Removal of Crevical plate and screws. Revision of C6-7 Arthodesis C7-T1 Anterior Disectomy foraminotomy, Arthodesis instrumentation;  Surgeon: Oumar Guerrero MD;  Location: [facility];  Service: Neurosurgery;  Laterality: N/A;  HARDWARE REMOVAL- ANTERIOR CERVICAL  Removal of Crevical plate and screws. Revision of C6-7 Arthodesis  C7-T1 Anterior Disectom    TONSILLECTOMY      TOTAL REDUCTION MAMMOPLASTY         Clinical Decision Making:     History  Co-morbidities and personal factors that may impact the plan of care Examination  Body Structures and Functions, activity limitations and participation restrictions that may impact the plan of care Clinical Presentation   Decision Making/ Complexity Score   Co-morbidities:   [] Time since onset of injury / illness / exacerbation  [] Status of current condition  []Patient's cognitive status and safety concerns    [] Multiple Medical Problems (see med hx)  Personal Factors:   [] Patient's age  [x] Prior Level of function   [] Patient's home situation (environment and family support)  [x] Patient's level of motivation  [] Expected progression of patient      HISTORY:(criteria)    [] 39925 - no personal factors/history    [x] 38331 - has 1-2 personal factor/comorbidity     [] 52516 - has >3 personal factor/comorbidity     Body Regions:  [] Objective examination findings  [x] Head     []  Neck  [] Trunk   [] Upper Extremity  [x] Lower Extremity    Body Systems:  [] For communication ability, affect, cognition, language, and learning style: the assessment of the ability to make needs known, consciousness, orientation (person, place, and time), expected emotional /behavioral responses, and learning preferences (eg, learning barriers, education  needs)  [x] For the neuromuscular system: a general assessment of gross coordinated movement (eg, balance, gait, locomotion,  transfers, and transitions) and motor function  (motor control and motor learning)  [x] For the musculoskeletal system: the assessment of gross symmetry, gross range of motion, gross strength, height, and weight  [] For the integumentary system: the assessment of pliability(texture), presence of scar formation, skin color, and skin integrity  [] For cardiovascular/pulmonary system: the assessment of heart rate, respiratory rate, blood pressure, and edema     Activity limitations:    [] Patient's cognitive status and saf ety concerns          [] Status of current condition      [] Weight bearing restriction  [] Cardiopulmunary Restriction    Participation Restrictions:   [] Goals and goal agreement with the patient     [] Rehab potential (prognosis) and probable outcome      Examination of Body System: (criteria)    [x] 79208 - addressing 1-2 elements    [] 91945 - addressing a total of 3 or more elements     [] 15088 -  Addressing a total of 4 or more elements         Clinical Presentation: (criteria)  Stable - 62695     On examination of body system using standardized tests and measures patient presents with 1-2 elements from any of the following: body structures and functions, activity limitations, and/or participation restrictions.  Leading to a clinical presentation that is considered stable and/or uncomplicated                              Clinical Decision Making  (Eval Complexity):  Low- 10419     Time Tracking:     PT Received On: 06/15/18  PT Start Time: 1125     PT Stop Time: 1155  PT Total Time (min): 30 min     Billable Minutes: Evaluation 10 and Gait Training 20      Oumar Torrez, PT  06/15/2018

## 2018-06-15 NOTE — PROGRESS NOTES
1pm- Patient's daughter, Katharina at nurses station requesting RW to bedside for use by patient. Patient daughter states patient has been waiting for staff for assistance. PCT to assist patient now. TN informed daughter this TN will contact PT and assure patient safe to use RW unattended. Patient has RW in place at home. TN placed call to acute PT, spoke with Brittaney, Brittaney will have Preston, assigned PT update TN.    1:45pm- Preston, PT on floor, states patient is not safe to have RW unattended, staff will have to assist patient with transfers and needs.     2:12pm-TN rounded on patient to provide update, hygiene being provided.  Plan of care discussed with GLORIA Garcia.      3:15pm-TN discussed plan with patient and daughter Katharina. Plan for patient to discharge in am with home health. HH list provided for preferences. Bed side delivery to be used today. Patient would like [facility]. Referral sent via [facility].

## 2018-06-15 NOTE — PLAN OF CARE
Problem: Patient Care Overview  Goal: Plan of Care Review  Outcome: Ongoing (interventions implemented as appropriate)  Mrs. Cano post op vital signs remains elevated /80 mm of Hg. Dr. Tesfaye was contacted and given order for Clonidine 0.1mg oral with good effect. Regular analgesia given as documented for pain. Hemovac drain in situ (Right anterior neck) and emptied 90 mls of Serosanguineous fluid. Urinary catheter in with good output. Remains on  IV fluids Dextrose Normal saline with 20KCL 125 ml/hr. Clear liquid diet tolerated well. Slept fairly well during night.

## 2018-06-15 NOTE — OP NOTE
DATE OF SURGERY: 06/14/2018     PREOPERATIVE DIAGNOSES:     1. s/p C5-6 and C6-7 anterior fusion and instrumentation  2. 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 Spondylosis at C7-T1 with stenosis, myelopathy and radiculopathy  4. Morbid obesity with BMI 44.97       POSTOPERATIVE DIAGNOSES:     1. s/p C5-6 and C6-7 anterior fusion and instrumentation  2. 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 Spondylosis at C7-T1 with stenosis, myelopathy and radiculopathy  4. Morbid obesity with BMI 44.97     PROCEDURES PERFORMED:  1. Right anterior access of the cervical spine.  2. Resection of anterior plate from C5 to C7  3. Revision of anterior C6-7 discectomy, bilateral foraminotomy and arthrodesis  4. C7-T1 anterior discectomy, bilateral foraminotomy and arthrodesis  5. Instrumentation at C6, C7 and T1 with screws on plate.  6. Microscope assistance.  7. Fluoroscopic localization.     PRIMARY SURGEON: Oumar Guerrero M.D.     ASSISTANT SURGEON: GLORIA Cordova was the first assistant for this procedure as there was no qualified resident available to assist.     Complexity: This was deemed to be a more complex procedure requiring more time and skills due to the prior anterior fusion surgery and the morbid obesity. One hour more was required to dissect the scar tissue. 30 minutes more was needed due to the longer working distance caused by the obesity.      INDICATION: This is 67-year-old female who had a long-standing C5-7 anterior fusion. She developed pseudoarthrosis at C6-7, chronic neck pain and worsening spondylosis at C6-7 and C7-T1. with cervical spondylosis, myelopathy and radiculopathy. Risks, benefits, alternatives and limitations were discussed with the patient. The risks included nerve root injury, dysphagia, vocal cord paralysis, spinal cord injury that can cause paralysis, hardware failure and subsidence that could require  re-operation as well as hardware and screw malpositioning and vertebral artery injury that can cause a stroke. The patient wanted to proceed and the consent was obtained.       DESCRIPTION OF THE PROCEDURE: The patient was intubated under general    anesthesia and the head was placed on the horseshoe head carr in the supine    position. All pressure points were carefully padded. We installed a    Tu Closet Mi Closet-Twirl TV tong and put 10 pounds of traction. Fluoroscopic localization was    utilized to localize the C5-T1 levels. A right 3 cm horizontal incision medial to the border of the sternocleidomastoid muscle was planned with fluoroscopy and based on her old scar. The patient was prepped and draped in the typical sterile fashion. Incision was made with a #15 blade. The fascial layer was then dissected using both blunt and sharp dissection. Platysma was divided and hemostasis was carried out with a bipolar and a corridor was created in the avascular plane between the trachea and the esophagus medially and the carotid artery laterally. There was significant scar tissue in the prevertebral space. Dissection of the scar tissue was carried out and the anterior plate was dissected. We removed the six screws holding the plate and removed the plate. We add weight on the traction for a total of 15 pounds.     We placed our retractor at C7-T1. The disc space was exposed. During the discectomy, arterial bleeding coming from the left longus colli was noticed and the hemostasis was completed with a large hemoclip. Diskectomy was carried out C7-T1 using the #15 blade, pituitary, Riley and the high speed drill.  The diskectomy was completed by using a curved curette and the cartilage on the plate was scrapped off. The endplates were slightly drilled and the bone on each side of the endplate was harvested as an autograft. The foramen on each side was drilled and the posterior longitudinal ligament was also resected to expose the  anterior portion of the nerve root bilaterally. After completion of the decompression, the dura was fully decompressed. Hemostasis in the epidural space was done with Gelfoam powder soaked in thrombin. After copious irrigation, we inserted a 9 mm trial in the disk space and then inserted a [id number] mm 7 degrees lordotic cage filled with Viacell. The graft was well positioned using fluoroscopy and there was no distraction of the facet posteriorly.     We placed our retractor at C6-7. Diskectomy was carried out C6-7 using the #15 blade, pituitary, Kerissons and the high speed drill. There was no fusion at this level hence confirming the pseudoarthrosis.   The diskectomy was completed by using a curved curette and the cartilage on the plate was scrapped off. The endplates were slightly drilled and the bone on each side of the endplate was harvested as an autograft. The foramen on each side was drilled and the posterior longitudinal ligament was also resected to expose the anterior portion of the nerve root bilaterally. After completion of the decompression, the dura was fully decompressed. Hemostasis in the epidural space was done with Gelfoam powder soaked in thrombin. After copious irrigation, we inserted a 10 mm trial in the disk space and then inserted a [id number] mm 7 degrees lordotic cage filled with Viacell and DBX. The graft was well positioned using fluoroscopy and there was no distraction of the facet posteriorly.     We removed the weights from the traction.      We fixated the plate with a 16 mm variable screw at C6, two 16 mm variable screws at C7 and one 14 mm variable screw at T1. The locking mechanism was engaged.       Hemostasis was completed with bipolar and  Surgifoam and copious irrigation with saline was carried out. The platysma was then closed with interrupted 3-0 Vicryl suture and the subcutaneous layer was closed with running subcuticular suture with 4-0 Monocryl. Steri-Strips were placed on the  skin edges using Mastisol. The retractor was removed at  the end of the surgery. There was no complication.     Blood loss 350 cc

## 2018-06-15 NOTE — PROGRESS NOTES
[facility]  Neurosurgery  Progress Note    Subjective:     History of Present Illness: Marie Cano is a 67 y.o. female who had a long-standing C5-7 anterior fusion. She developed pseudoarthrosis at C6-7, chronic neck pain and worsening spondylosis at C6-7 and C7-T1. with cervical spondylosis, myelopathy and radiculopathy. Risks, benefits, alternatives and limitations were discussed with the patient. The risks included nerve root injury, dysphagia, vocal cord paralysis, spinal cord injury that can cause paralysis, hardware failure and subsidence that could require re-operation as well as hardware and screw malpositioning and vertebral artery injury that can cause a stroke. The patient wanted to proceed and the consent was obtained.     Post-Op Info:  Procedure(s) (LRB):  HARDWARE REMOVAL- ANTERIOR CERVICAL Removal of Crevical plate and screws. Revision of C6-7 Arthodesis C7-T1 Anterior Disectomy foraminotomy, Arthodesis instrumentation (N/A)   1 Day Post-Op      Interval History: POD 1. NAEON. Garcia in place since surgery. Hemovac drain in place. Patient is doing well this morning. She reports posterior shoulder pain and left hand pain.     Medications:  Continuous Infusions:   dextrose 5 % and 0.9 % NaCl with KCl 20 mEq 100 mL/hr at 06/15/18 0656     Scheduled Meds:   acetaminophen  1,000 mg Oral Q8H    celecoxib  200 mg Oral BID    doxepin  10 mg Oral QHS    DULoxetine  60 mg Oral Daily    heparin (porcine)  5,000 Units Subcutaneous Q8H    irbesartan  300 mg Oral QHS    mupirocin  1 g Nasal BID    pregabalin  150 mg Oral QID    rosuvastatin  10 mg Oral Daily    sertraline  100 mg Oral Daily    traMADol  100 mg Oral Q6H     PRN Meds:aluminum-magnesium hydroxide-simethicone, cyclobenzaprine, ondansetron, oxyCODONE, propranolol, senna-docusate 8.6-50 mg     Review of Systems  Objective:     Weight: 117.1 kg (258 lb 2.5 oz)  Body mass index is 44.31 kg/m².  Vital Signs (Most  Recent):  Temp: 97.7 °F (36.5 °C) (06/15/18 0743)  Pulse: 70 (06/15/18 0743)  Resp: 18 (06/15/18 0743)  BP: (!) 158/77 (06/15/18 0743)  SpO2: (!) 94 % (06/15/18 0324) Vital Signs (24h Range):  Temp:  [97.1 °F (36.2 °C)-98.6 °F (37 °C)] 97.7 °F (36.5 °C)  Pulse:  [] 70  Resp:  [10-20] 18  SpO2:  [94 %-100 %] 94 %  BP: (158-187)/(74-90) 158/77       Date 06/15/18 0700 - 06/16/18 0659   Shift 3654-5501 1177-1963 6963-0727 24 Hour Total   I  N  T  A  K  E   Shift Total  (mL/kg)       O  U  T  P  U  T   Drains 90   90    Shift Total  (mL/kg) 90  (0.8)   90  (0.8)   Weight (kg) 117.1 117.1 117.1 117.1              Vent Mode: Spont  Oxygen Concentration (%):  [] 40  Resp Rate Total:  [13 br/min-22 br/min] 22 br/min  Vt Set:  [450 mL] 450 mL  PEEP/CPAP:  [5 cmH20] 5 cmH20  Pressure Support:  [0 cmH20-5 cmH20] 0 cmH20  Mean Airway Pressure:  [6.7 cmH20-9.8 cmH20] 6.7 cmH20         Closed/Suction Drain 06/14/18 1516 Right Neck 0.1 Fr. (Active)   Site Description Unable to view 6/14/2018  7:40 PM   Dressing Status Clean;Dry;Intact 6/14/2018  7:40 PM   Drainage None 6/14/2018  7:40 PM   Status To bulb suction 6/14/2018  7:40 PM   Output (mL) 90 mL 6/15/2018  7:00 AM            Urethral Catheter 06/14/18 1145 Straight-tip;Non-latex 16 Fr. (Active)   Site Assessment Clean;Intact 6/14/2018  7:40 PM   Collection Container Urimeter 6/14/2018  7:40 PM   Securement Method secured to top of thigh w/ adhesive device 6/14/2018  7:40 PM   Output (mL) 500 mL 6/15/2018 12:05 AM       Neurosurgery Physical Exam   General: well developed, well nourished, no distress.   Neurologic: Alert and oriented. Thought content appropriate.  GCS: Motor: 6/Verbal: 5/Eyes: 4 GCS Total: 15  Mental Status: Awake, Alert, Oriented x 4  Language: No aphasia  Speech: No dysarthria  Cranial nerves: face symmetric, tongue midline, CN II-XII grossly intact.   Head: normocephalic, atraumatic  Eyes: EOMI.  Neck: trachea midline. No JVD  Cardiovascular: No  LE edema   Pulmonary: normal respirations, no signs of respiratory distress  Skin: Skin is warm, dry and intact.    Motor Strength: Moves all extremities spontaneously with good tone.     Strength  Deltoids Triceps Biceps Hand    Upper: R 5/5 5/5 5/5 5-/5    L 5/5 5/5 5/5 5-/5     Dressing intact  Collar in place       Significant Labs:  No results for input(s): GLU, NA, K, CL, CO2, BUN, CREATININE, CALCIUM, MG in the last 48 hours.  No results for input(s): WBC, HGB, HCT, PLT in the last 48 hours.  No results for input(s): LABPT, INR, APTT in the last 48 hours.  Microbiology Results (last 7 days)     ** No results found for the last 168 hours. **          Significant Diagnostics:  XR cervical spine pending     Assessment/Plan:     Active Diagnoses:    Diagnosis Date Noted POA    PRINCIPAL PROBLEM:  Cervical spondylosis with myelopathy and radiculopathy [M47.12, M47.22] 06/14/2018 Yes      Problems Resolved During this Admission:    Diagnosis Date Noted Date Resolved POA     66 yo female s/p C6-7 revision ACDF, C7-T1 ACDF    -POD 1  -Neurologically stable  -Pain controlled on current regimen  -Drain 110 ml since surgery. Will keep   -XR pending  -Aspen collar at all times  -PT. Appreciate recommendations  -Teds, SCDs, SQH for DVT prophylaxis  -Discontinue rodriguez. Monitor output  -Discontinue IVF. Advance diet as tolerated       Radha Hernadez PA-C  Neurosurgery  [facility]    4:00 pm: patient discussed with Dr. Guerrero. Spoke with patient and daughter for approximately 45 minutes today to discuss surgery and post surgical care. Patient would like to stay one more night. Scripts sent to pharmacy for delivery today. Home health ordered and will be set up prior to discharge. Dr. Guerrero instructed for drain to be removed today. Drain removed without complication. Transportation arrived to bring patient to XR. Dr. Guerrero requested discharge to be ordered for tomorrow morning. Discharge instructions in  chart. Patient and daughter verbalized understanding of instructions. Discharge ordered placed for tomorrow morning.

## 2018-06-15 NOTE — PLAN OF CARE
TN completed discharge assessment with daugter and patient. GLORIA Garcia at bedside aswell. Plan of care discussed.   Patient to discharge tomorrow. Patient has declined the BSC at this time. RW in place. Family available to transport patient tomorrow.      TN discussed plan with patient and daughter Katharina. Plan for patient to discharge in am with home health. Bed side delivery to be used today. Patient would like [facility] . Referral sent via [facility].   update: patient accepted by [facility] .      Future Appointments  Date Time Provider Department Center   7/2/2018 10:45 AM [facility] XR-A [facility] [place] [facility]   7/2/2018 11:15 AM Radha Hernadez PA-C [facility] NEUROSU [place] [facility]   7/24/2018 3:15 PM [facility] XR[facility]A [facility] [place] [facility]   7/24/2018 3:45 PM Oumar Guerrero MD [facility] NEUROSU [place] [facility]       Follow-up With  Details  Why  Contact Info   Oumar Guerrero MD  On 7/2/2018  @11:15am : xray scheduled prior for 10:45am  [address]  [address]  [facility] [address]  [phone]   [facility]    Home Health  [address] [facility] [address]  [phone]          06/15/18 1624   Discharge Assessment   Assessment Type Discharge Planning Assessment   Confirmed/corrected address and phone number on facesheet? Yes   Assessment information obtained from? Patient;Caregiver   Expected Length of Stay (days) 2   Communicated expected length of stay with patient/caregiver yes   Prior to hospitilization cognitive status: Alert/Oriented   Prior to hospitalization functional status: Independent   Current cognitive status: Alert/Oriented   Current Functional Status: Assistive Equipment;Independent   Lives With sibling(s)   Able to Return to Prior Arrangements yes   Is patient able to care for self after discharge? Yes   Who are your caregiver(s) and their phone number(s)? DaughterAcacia Posadas- [phone]    Patient's perception of discharge disposition home or selfcare   Readmission Within The Last 30  Days no previous admission in last 30 days   Patient currently being followed by outpatient case management? No   Patient currently receives any other outside agency services? Yes   Is it the patient/care giver preference to resume care with the current outside agency? Yes   Equipment Currently Used at Home walker, rolling   Do you have any problems affording any of your prescribed medications? No  (daughter Katharina to be called to arrnage medication payments. Preston updated with [facility] pharmacy)   Is the patient taking medications as prescribed? yes   Does the patient have transportation home? Yes   Transportation Available family or friend will provide   Does the patient receive services at the [facility]? No   Discharge Plan A Home with family   Discharge Plan B Home with family   Patient/Family In Agreement With Plan yes

## 2018-06-15 NOTE — DISCHARGE SUMMARY
[facility]  Neurosurgery  Discharge Summary      Patient Name: Marie Cano  MRN: [id number]  Admission Date: 6/14/2018  Hospital Length of Stay: 2 days  Discharge Date and Time: 6/16/2018 10:33 AM  Attending Physician: Oumar Guerrero MD   Discharging Provider: Radha Hernadez PA-C  Primary Care Provider: Vanna Rios MD     HPI: Marie Cano is a 67 y.o. female who had a long-standing C5-7 anterior fusion. She developed pseudoarthrosis at C6-7, chronic neck pain and worsening spondylosis at C6-7 and C7-T1. with cervical spondylosis, myelopathy and radiculopathy. Risks, benefits, alternatives and limitations were discussed with the patient. The risks included nerve root injury, dysphagia, vocal cord paralysis, spinal cord injury that can cause paralysis, hardware failure and subsidence that could require re-operation as well as hardware and screw malpositioning and vertebral artery injury that can cause a stroke. The patient wanted to proceed and the consent was obtained.     Procedure(s) (LRB):  HARDWARE REMOVAL- ANTERIOR CERVICAL Removal of Crevical plate and screws. Revision of C6-7 Arthodesis C7-T1 Anterior Disectomy foraminotomy, Arthodesis instrumentation (N/A)     Hospital Course:   Marie Cano presented to [facility] on 6/14/2018 for the above stated procedure. she tolerated the procedure well and there were no intra-operative complications. she recovered in PACU and was then transferred to the floor, where her pain was controlled. Post-op xrays showed good placement of the hardware. she was evaluated by PT/OT who recommended discharge home with family support and home health. Hemovac drain and rodriguez were removed on 6/15, both without complication. On 6/16/2018, she was discharged home with pain medication and follow up appointments. Regular diet. Activity as tolerated. At the time of discharge, vital signs were stable, patient was afebrile and neurologically stable. Discharge  instructions were given verbally/written to the patient and her family and all of their questions were answered. Patient and family voiced understanding. They were encouraged to call the clinic with any questions they might have prior to the follow up appointments.      Consults: PT    Significant Diagnostic Studies: XR cervical spine    Pending Diagnostic Studies:     Procedure Component Value Units Date/Time    X-Ray Cervical Spine AP And Lateral [[id number]]     Order Status:  Sent Lab Status:  No result         Final Active Diagnoses:    Diagnosis Date Noted POA    PRINCIPAL PROBLEM:  Cervical spondylosis with myelopathy and radiculopathy [M47.12, M47.22] 06/14/2018 Yes      Problems Resolved During this Admission:    Diagnosis Date Noted Date Resolved POA      Discharged Condition: good    Disposition: Home or Self Care, Home Health     Follow Up:  Follow-up Information     Oumar Guerrero MD In 2 weeks.    Specialty:  Neurosurgery  Contact information:  [address]  [address]  [place] [address]  [phone]                 Patient Instructions:     Diet Adult Regular     Activity as tolerated     Shower on day dressing removed (No bath)     Notify your health care provider if you experience any of the following:  temperature >100.4     Notify your health care provider if you experience any of the following:  persistent nausea and vomiting or diarrhea     Notify your health care provider if you experience any of the following:  severe uncontrolled pain     Notify your health care provider if you experience any of the following:  redness, tenderness, or signs of infection (pain, swelling, redness, odor or green/yellow discharge around incision site)     Notify your health care provider if you experience any of the following:  difficulty breathing or increased cough     Notify your health care provider if you experience any of the following:  severe persistent headache     Notify your health care provider  if you experience any of the following:  worsening rash     Notify your health care provider if you experience any of the following:  persistent dizziness, light-headedness, or visual disturbances     Notify your health care provider if you experience any of the following:  increased confusion or weakness     No dressing needed   Order Comments: Remove dressing today after shower       Medications:  Reconciled Home Medications:      Medication List      START taking these medications    celecoxib 200 MG capsule  Commonly known as:  CeleBREX  Take 1 capsule (200 mg total) by mouth 2 (two) times daily as needed for Pain.     oxyCODONE 5 MG immediate release tablet  Commonly known as:  ROXICODONE  Take 1 tablet (5 mg total) by mouth every 6 (six) hours as needed (severe pain).     senna-docusate 8.6-50 mg 8.6-50 mg per tablet  Commonly known as:  PERICOLACE  Take 2 tablets by mouth nightly as needed for Constipation.     tiZANidine 4 MG tablet  Commonly known as:  ZANAFLEX  Take 1 tablet (4 mg total) by mouth 3 (three) times daily as needed (muscle spasms).        CHANGE how you take these medications    traMADol 50 mg tablet  Commonly known as:  ULTRAM  Take 1-2 tablets (50 mg - 100 mg total) by mouth every 6 (six) hours as needed for pain  What changed:  See the new instructions.        CONTINUE taking these medications    baclofen 20 MG tablet  Commonly known as:  LIORESAL  TAKE 1/2-1 TABLET BY MOUTH THREE TIMES A DAY AS NEEDED     doxepin 10 MG capsule  Commonly known as:  SINEQUAN  TAKE 1-2 CAPSULES BY MOUTH AT BEDTIME     DULoxetine 60 MG capsule  Commonly known as:  CYMBALTA  Take 1 capsule (60 mg total) by mouth once daily.     ergocalciferol 50,000 unit Cap  Commonly known as:  ERGOCALCIFEROL  TAKE 1 CAPSULE BY MOUTH TWICE A WEEK     irbesartan 300 MG tablet  Commonly known as:  AVAPRO  Take 1 tablet (300 mg total) by mouth every evening.     levocetirizine 5 MG tablet  Commonly known as:  XYZAL  TAKE 1 TABLET  BY MOUTH DAILY EVERY EVENING     LYRICA 150 MG capsule  Generic drug:  pregabalin  TAKE 1 CAPSULE BY MOUTH FOUR TIMES A DAY     propranolol 10 MG tablet  Commonly known as:  INDERAL  Take 1 tablet (10 mg total) by mouth 2 (two) times daily as needed. tremors     rosuvastatin 10 MG tablet  Commonly known as:  CRESTOR  Take 1 tablet (10 mg total) by mouth once daily. For cholesterol     sertraline 50 MG tablet  Commonly known as:  ZOLOFT  TAKE 2 TABLETS BY MOUTH DAILY     temazepam 30 mg capsule  Commonly known as:  RESTORIL  TAKE 1 CAPSULE BY MOUTH AT BEDTIME AS NEEDED FOR INSOMNIA        STOP taking these medications    meloxicam 15 MG tablet  Commonly known as:  KELTON Hernadez PA-C  Neurosurgery  [facility]

## 2018-06-15 NOTE — PLAN OF CARE
Problem: Patient Care Overview  Goal: Plan of Care Review  Outcome: Ongoing (interventions implemented as appropriate)  Pt on RA with documented sats.99%.  Will continue to monitor.

## 2018-06-15 NOTE — PLAN OF CARE
Problem: Physical Therapy Goal  Goal: Physical Therapy Goal  Goals to be met by: 7/15/2018     Patient will increase functional independence with mobility by performin. Supine to sit with Modified [place]  2. Sit to stand transfer with Modified [place]  3. Gait  x 50 feet with Supervision using Rolling Walker.     Outcome: Ongoing (interventions implemented as appropriate)  Recommend Home with Home Health pending progress  No DME needs apparent

## 2018-06-16 VITALS
RESPIRATION RATE: 18 BRPM | WEIGHT: 258.19 LBS | HEART RATE: 77 BPM | DIASTOLIC BLOOD PRESSURE: 86 MMHG | BODY MASS INDEX: 44.08 KG/M2 | SYSTOLIC BLOOD PRESSURE: 141 MMHG | HEIGHT: 64 IN | OXYGEN SATURATION: 94 % | TEMPERATURE: 98 F

## 2018-06-16 PROCEDURE — 63600175 PHARM REV CODE 636 W HCPCS: Performed by: PHYSICIAN ASSISTANT

## 2018-06-16 PROCEDURE — 94761 N-INVAS EAR/PLS OXIMETRY MLT: CPT

## 2018-06-16 PROCEDURE — 25000003 PHARM REV CODE 250: Performed by: PHYSICIAN ASSISTANT

## 2018-06-16 RX ADMIN — PREGABALIN 150 MG: 75 CAPSULE ORAL at 08:06

## 2018-06-16 RX ADMIN — SERTRALINE HYDROCHLORIDE 100 MG: 100 TABLET ORAL at 08:06

## 2018-06-16 RX ADMIN — TRAMADOL HYDROCHLORIDE 100 MG: 50 TABLET, COATED ORAL at 05:06

## 2018-06-16 RX ADMIN — ACETAMINOPHEN 1000 MG: 500 TABLET, FILM COATED ORAL at 05:06

## 2018-06-16 RX ADMIN — MUPIROCIN 1 G: 20 OINTMENT TOPICAL at 08:06

## 2018-06-16 RX ADMIN — CYCLOBENZAPRINE HYDROCHLORIDE 5 MG: 5 TABLET, FILM COATED ORAL at 08:06

## 2018-06-16 RX ADMIN — HEPARIN SODIUM 5000 UNITS: 5000 INJECTION, SOLUTION INTRAVENOUS; SUBCUTANEOUS at 05:06

## 2018-06-16 RX ADMIN — CELECOXIB 200 MG: 100 CAPSULE ORAL at 08:06

## 2018-06-16 RX ADMIN — OXYCODONE HYDROCHLORIDE 5 MG: 5 TABLET ORAL at 12:06

## 2018-06-16 RX ADMIN — OXYCODONE HYDROCHLORIDE 5 MG: 5 TABLET ORAL at 07:06

## 2018-06-16 RX ADMIN — ROSUVASTATIN CALCIUM 10 MG: 10 TABLET, FILM COATED ORAL at 08:06

## 2018-06-16 RX ADMIN — DULOXETINE 60 MG: 30 CAPSULE, DELAYED RELEASE ORAL at 08:06

## 2018-06-16 NOTE — NURSING
Pt has had a shower this AM. R Neck dressing removed by MD. Steri-strips intact. Has RX at bedside , delivered yesterday. Medications reviewed with pt who verbalized an understanding of med regime and when its OK to remove [place] Brace- for showers only. Home Health set up . [employer] to sign on pt tomorrow. Follow up appt made. Cesar to  pt.

## 2018-06-16 NOTE — PLAN OF CARE
HH arranged with [employer] , [phone], orders sent via [employer] on Friday and accepted. TN confirmed with Sujatha [phone] this AM, pt to be seen Sunday 6/17/18    Patient has declined the BSC at this time. RW in place. Family to transport patient home on discharge    Future Appointments  Date Time Provider Department Center   7/2/2018 10:45 AM [facility] OD XR-A [facility] XRAY OP [place] [facility]   7/2/2018 11:15 AM Radha Hernadez PA-C [facility] NEUROSU [place] [facility]   7/24/2018 3:15 PM [facility] ODC XR-A [facility] XRAY OP [place] [facility]   7/24/2018 3:45 PM [facility]umar [facility]errero MD [facility]       Pt's nurse will go over medications/signs and symptoms prior to discharge       06/16/18 0749   Final Note   Assessment Type Final Discharge Note   Discharge Disposition Home-Health  ([facility] )   What phone number can be called within the next 1-3 days to see how you are doing after discharge? [phone]   Hospital Follow Up  Appt(s) scheduled? Yes   [facility] Referral Info   Post Acute Recommendation Home-care     Capri Varela, RN Transitional Navigator  [phone]

## 2018-06-16 NOTE — PLAN OF CARE
Problem: Patient Care Overview  Goal: Plan of Care Review  Outcome: Ongoing (interventions implemented as appropriate)  AAOx3.  Respirations even and unlabord; breath sounds clear.  RA with saturation 92%.  Denies n/v and sob.  C/o neck pain; alternating tylenol 1000mg po and ultram 100mg po with oxy ir 5mg po.  Pain decreased from 7/10 to 3/10.  Right anterior neck dressing with small serosanguinous drainage noted; without redness or swelling.  Aspen collar in place.  Ambulating to BSC and back to bed with 1 assist.  Bed alarm.  TEDs on. Fall precaution and allergy armbands on.  Fall precaution sign on door.  Instructed to call for assistance.

## 2018-06-16 NOTE — PROGRESS NOTES
NEUROSURGICAL POST-OPERATIVE PROGRESS NOTE    DATE OF SERVICE:  06/16/2018      ATTENDING PHYSICIAN:  Oumar Guerrero MD    SUBJECTIVE:    INTERIM HISTORY:    This is a very pleasant 67 y.o. y.o. female, who is status day 2 revision of C6-7 ACDF and C7-T1 ACDF for pseudoarthrosis and worsening myelopathy. She is doing well this morning. No significant pain. No difficulty swallowing.                OBJECTIVE:    PHYSICAL EXAMINATION:   Vitals:    06/16/18 0732   BP: (!) 141/86   Pulse: 77   Resp: 18   Temp: 98.4 °F (36.9 °C)       Neurosurgery Physical Exam    Ortho Exam    Neurologic Exam     Motor Exam     Strength   Right deltoid: 5/5  Left deltoid: 5/5  Right biceps: 5/5  Left biceps: 5/5  Right triceps: 5/5  Left triceps: 5/5  Right interossei: 4/5  Left interossei: 4/5  Right iliopsoas: 5/5  Left iliopsoas: 5/5      Dressing removed, no discharge    DIAGNOSTIC DATA:    X-ray of the cervical spine, AP and lateral views reveals the fusion hardware is intact. No loosening of screws or migration of hardware.    ASSESMENT:    This is a 67 y.o. female who is s/p day 2 revision of C6-7 ACDF and C7-T1 ACDF. Doing well.     PLAN:    Discharge home this morning  Follow-up on 07/16  Verbal instructions given        Oumar Guerrero MD  Pager: [phone]

## 2018-06-17 LAB
BLD PROD TYP BPU: NORMAL
BLD PROD TYP BPU: NORMAL
BLOOD UNIT EXPIRATION DATE: NORMAL
BLOOD UNIT EXPIRATION DATE: NORMAL
BLOOD UNIT TYPE CODE: 6200
BLOOD UNIT TYPE CODE: 6200
BLOOD UNIT TYPE: NORMAL
BLOOD UNIT TYPE: NORMAL
CODING SYSTEM: NORMAL
CODING SYSTEM: NORMAL
DISPENSE STATUS: NORMAL
DISPENSE STATUS: NORMAL
TRANS ERYTHROCYTES VOL PATIENT: NORMAL ML
TRANS ERYTHROCYTES VOL PATIENT: NORMAL ML

## 2018-06-18 ENCOUNTER — PATIENT OUTREACH (OUTPATIENT)
Dept: ADMINISTRATIVE | Facility: CLINIC | Age: 68
End: 2018-06-18

## 2018-06-18 ENCOUNTER — TELEPHONE (OUTPATIENT)
Dept: NEUROSURGERY | Facility: CLINIC | Age: 68
End: 2018-06-18

## 2018-06-18 NOTE — TELEPHONE ENCOUNTER
----- Message from Oumar Guerrero MD sent at 6/16/2018  7:55 AM CDT -----  Patient needs follow-up on 07/16. Her sister book her flight to help her on that date

## 2018-06-18 NOTE — TELEPHONE ENCOUNTER
Spoke to the patient and instructed to discuss at her nest appointment when her next post op will be.

## 2018-06-18 NOTE — PATIENT INSTRUCTIONS
Incision Care  Remember: Follow-up visits allow your healthcare provider to make sure your incision is healing well. Be sure to keep your appointments.     Stitches (sutures), surgical staples, special strips of surgical tape, or surgical skin glue may be used to close incisions. They also help stop bleeding and speed healing. To help your incision heal, follow the tips on this handout.  Home care  Tips for home care include the following:  · Always wash your hands before touching your incision.  · Keep your incision clean and dry.  · Avoid doing things that could cause dirt or sweat to get on your incision.  · Dont pick at scabs. They help protect the wound.  · Keep your incision out of water.  · Take a sponge bath to avoid getting your incision wet, unless your healthcare provider tells you otherwise.  · Ask your provider when can you take a shower or bathe.  · Ask your provider about the best way to keep your incision dry when bathing or showering.  · Pat stitches dry if they get wet. Dont rub.  · Leave the bandage (dressing) in place until you are told to remove it or change it. Change it only as directed, using clean hands.  · After the first 12 hours, change your dressing every 24 hours, or as directed by your healthcare provider.  · Change your dressing if it gets wet or soiled.  Care for specific closures  Follow these guidelines unless your healthcare provider tells you otherwise:  · Stitches or staples. Once you no longer need to keep these dry, clean the wound daily. First remove the bandage using clean hands. Then wash the area gently with soap and warm water. Use a wet cotton swab to loosen and remove any blood or crust that forms. After cleaning, put a thin layer of antibiotic ointment on. Then put on a new bandage.  · Skin glue. Dont put liquid, ointment, or cream on your wound while the glue is in place. Avoid activities that cause heavy sweating. Protect the wound from sunlight. Do not scratch,  rub, or pick at the glue. Do not put tape directly over the glue. The glue should peel off within 5 to 10 days.  · Surgical tape. Keep the area dry. If it gets wet, blot the area dry with a clean towel. Surgical tape usually falls off within 7 to 10 days. If it has not fallen off after 10 days, contact your healthcare provider before taking it off yourself. If you are told to remove the tape, put mineral oil or petroleum jelly on a cotton ball. Gently rub the tape until it is removed.  Changing your dressing  Leave the dressing (bandage) in place until you are told to remove it or change it. Follow the instructions below unless told otherwise by your healthcare provider:  · Always wash your hands before changing your dressing.  · After the first 48 hours, the incision wound usually will have closed. At this point, leave the incision uncovered and open to the air. If the incision has not closed keep it covered.  · Cover your incision only if your clothing is rubbing it or causing irritation.  · Change your dressing if it gets wet or soiled.  Follow-up care  Follow up with your healthcare provider to ask how long sutures or staples should be left in place. Be sure to return for stitch or staple removal as directed. If dissolving stitches were used in your mouth, these will not need to be removed. They should fall out or dissolve on their own.  If tape closures were used, remove them yourself when your provider recommends if they have not fallen off on their own. If skin glue was used, the glue will wear off by itself.  When to seek medical care  Call your healthcare provider if you have any of the following:  · More pain, redness, swelling, bleeding, or foul-smelling discharge around the incision area  · Fever of 100.4°F (38ºC) or higher, or as directed by your healthcare provider  · Shaking chills  · Vomiting or nausea that doesn't go away  · Numbness, coldness, or tingling around the incision area, or changes in  skin color  · Opening of the sutures or wound  · Stitches or staples come apart or fall out or surgical tape falls off before 7 days or as directed by your healthcare provider   Date Last Reviewed: 12/1/2016  © 0404-7669 [employer]. [address] [place] [address], [place] [address]. All rights reserved. This information is not intended as a substitute for professional medical care. Always follow your healthcare professional's instructions.

## 2018-06-20 ENCOUNTER — TELEPHONE (OUTPATIENT)
Dept: NEUROSURGERY | Facility: CLINIC | Age: 68
End: 2018-06-20

## 2018-06-21 DIAGNOSIS — B02.29 POSTHERPETIC NEURALGIA: ICD-10-CM

## 2018-06-21 NOTE — TELEPHONE ENCOUNTER
----- Message from Serena Noble sent at 6/21/2018  4:38 PM CDT -----  Contact: [phone]/self  Patient requesting refills for temazepam (RESTORIL) 30 mg capsule and LYRICA 150 mg capsule. [facility]. Please advise.

## 2018-06-22 RX ORDER — PREGABALIN 150 MG/1
CAPSULE ORAL
Qty: 120 CAPSULE | Refills: 0 | Status: SHIPPED | OUTPATIENT
Start: 2018-06-22 | End: 2018-06-23 | Stop reason: SDUPTHER

## 2018-06-22 RX ORDER — TEMAZEPAM 30 MG/1
30 CAPSULE ORAL NIGHTLY
Qty: 30 CAPSULE | Refills: 5 | Status: SHIPPED | OUTPATIENT
Start: 2018-06-22 | End: 2019-01-02 | Stop reason: SDUPTHER

## 2018-06-23 DIAGNOSIS — B02.29 POSTHERPETIC NEURALGIA: ICD-10-CM

## 2018-06-25 ENCOUNTER — TELEPHONE (OUTPATIENT)
Dept: FAMILY MEDICINE | Facility: CLINIC | Age: 68
End: 2018-06-25

## 2018-06-25 NOTE — TELEPHONE ENCOUNTER
----- Message from Amira Pedro sent at 6/25/2018 11:16 AM CDT -----  Contact: Self. [phone]  Patient would like to speak with you about still not getting pregabalin (LYRICA) 150 MG capsule and temazepam (RESTORIL) 30 mg capsule sent to [facility] [place] [facility] [address]. Patient would like the message sent on high alert because she has been waiting for the medication since last week. Please advise.

## 2018-06-26 RX ORDER — PREGABALIN 150 MG/1
CAPSULE ORAL
Qty: 120 CAPSULE | Refills: 2 | Status: SHIPPED | OUTPATIENT
Start: 2018-06-26 | End: 2018-09-07 | Stop reason: SDUPTHER

## 2018-06-28 ENCOUNTER — TELEPHONE (OUTPATIENT)
Dept: NEUROLOGY | Facility: CLINIC | Age: 68
End: 2018-06-28

## 2018-06-28 NOTE — TELEPHONE ENCOUNTER
Spoke to Esau with home PT and he stated patient is refusing for him to go out to her house. He wanted to know if it would be ok to discharge her. I told him yes since she is refusing him.

## 2018-06-28 NOTE — TELEPHONE ENCOUNTER
----- Message from Ryanne Burgess sent at 6/28/2018  3:07 PM CDT -----  Contact: [facility] your call. Please advise

## 2018-07-03 RX ORDER — PROPRANOLOL HYDROCHLORIDE 10 MG/1
10 TABLET ORAL 2 TIMES DAILY
Qty: 180 TABLET | Refills: 0 | Status: SHIPPED | OUTPATIENT
Start: 2018-07-03 | End: 2019-02-08 | Stop reason: SDUPTHER

## 2018-07-06 ENCOUNTER — TELEPHONE (OUTPATIENT)
Dept: FAMILY MEDICINE | Facility: CLINIC | Age: 68
End: 2018-07-06

## 2018-07-06 NOTE — TELEPHONE ENCOUNTER
----- Message from Mehdi Jeffery sent at 7/6/2018  2:02 PM CDT -----  Contact: Fariba avila/[facility]   [phone]  Fariba calling to speak with you to clarify a fax.   Please call and advise

## 2018-07-11 DIAGNOSIS — R25.2 SPASM: ICD-10-CM

## 2018-07-11 DIAGNOSIS — G47.00 INSOMNIA, UNSPECIFIED TYPE: ICD-10-CM

## 2018-07-11 DIAGNOSIS — J30.2 SEASONAL ALLERGIC RHINITIS, UNSPECIFIED TRIGGER: ICD-10-CM

## 2018-07-11 DIAGNOSIS — F43.10 PTSD (POST-TRAUMATIC STRESS DISORDER): ICD-10-CM

## 2018-07-11 DIAGNOSIS — F32.A DEPRESSION, UNSPECIFIED DEPRESSION TYPE: ICD-10-CM

## 2018-07-11 RX ORDER — MELOXICAM 15 MG/1
15 TABLET ORAL DAILY
Qty: 30 TABLET | Refills: 2 | Status: SHIPPED | OUTPATIENT
Start: 2018-07-11 | End: 2018-10-27 | Stop reason: SDUPTHER

## 2018-07-11 RX ORDER — DULOXETIN HYDROCHLORIDE 60 MG/1
60 CAPSULE, DELAYED RELEASE ORAL DAILY
Qty: 90 CAPSULE | Refills: 0 | Status: SHIPPED | OUTPATIENT
Start: 2018-07-11 | End: 2018-11-21 | Stop reason: SDUPTHER

## 2018-07-11 RX ORDER — BACLOFEN 20 MG/1
TABLET ORAL
Qty: 90 TABLET | Refills: 2 | Status: SHIPPED | OUTPATIENT
Start: 2018-07-11 | End: 2019-02-19 | Stop reason: SDUPTHER

## 2018-07-11 RX ORDER — ROSUVASTATIN CALCIUM 10 MG/1
10 TABLET, COATED ORAL DAILY
Qty: 90 TABLET | Refills: 3 | Status: SHIPPED | OUTPATIENT
Start: 2018-07-11 | End: 2019-07-11

## 2018-07-11 RX ORDER — ERGOCALCIFEROL 1.25 MG/1
CAPSULE ORAL
Qty: 16 CAPSULE | Refills: 0 | Status: SHIPPED | OUTPATIENT
Start: 2018-07-11 | End: 2019-01-02 | Stop reason: SDUPTHER

## 2018-07-11 RX ORDER — DOXEPIN HYDROCHLORIDE 10 MG/1
CAPSULE ORAL
Qty: 180 CAPSULE | Refills: 0 | Status: SHIPPED | OUTPATIENT
Start: 2018-07-11 | End: 2018-10-30 | Stop reason: SDUPTHER

## 2018-07-11 RX ORDER — DICLOFENAC SODIUM 10 MG/G
GEL TOPICAL
Refills: 3 | COMMUNITY
Start: 2018-06-04 | End: 2019-02-19

## 2018-07-11 RX ORDER — LIDOCAINE AND PRILOCAINE 25; 25 MG/G; MG/G
CREAM TOPICAL
Refills: 3 | COMMUNITY
Start: 2018-06-04 | End: 2019-02-19

## 2018-07-11 RX ORDER — SERTRALINE HYDROCHLORIDE 50 MG/1
100 TABLET, FILM COATED ORAL DAILY
Qty: 60 TABLET | Refills: 2 | Status: SHIPPED | OUTPATIENT
Start: 2018-07-11 | End: 2019-02-08 | Stop reason: SDUPTHER

## 2018-07-11 RX ORDER — LEVOCETIRIZINE DIHYDROCHLORIDE 5 MG/1
5 TABLET, FILM COATED ORAL NIGHTLY
Qty: 30 TABLET | Refills: 2 | Status: SHIPPED | OUTPATIENT
Start: 2018-07-11

## 2018-07-11 RX ORDER — MUPIROCIN CALCIUM 20 MG/G
CREAM TOPICAL
Refills: 0 | COMMUNITY
Start: 2018-05-11 | End: 2018-11-21

## 2018-07-16 ENCOUNTER — OFFICE VISIT (OUTPATIENT)
Dept: NEUROSURGERY | Facility: CLINIC | Age: 68
End: 2018-07-16
Payer: MEDICARE

## 2018-07-16 ENCOUNTER — HOSPITAL ENCOUNTER (OUTPATIENT)
Dept: RADIOLOGY | Facility: HOSPITAL | Age: 68
Discharge: HOME | End: 2018-07-16
Attending: NEUROLOGICAL SURGERY
Payer: MEDICARE

## 2018-07-16 VITALS
DIASTOLIC BLOOD PRESSURE: 103 MMHG | WEIGHT: 256 LBS | BODY MASS INDEX: 43.94 KG/M2 | HEART RATE: 101 BPM | SYSTOLIC BLOOD PRESSURE: 190 MMHG

## 2018-07-16 DIAGNOSIS — Z98.1 S/P CERVICAL SPINAL FUSION: Primary | ICD-10-CM

## 2018-07-16 DIAGNOSIS — Z98.1 ARTHRODESIS STATUS: ICD-10-CM

## 2018-07-16 PROCEDURE — 72040 X-RAY EXAM NECK SPINE 2-3 VW: CPT | Mod: 26,,, | Performed by: RADIOLOGY

## 2018-07-16 PROCEDURE — 99213 OFFICE O/P EST LOW 20 MIN: CPT | Mod: PBBFAC,25,PO | Performed by: NEUROLOGICAL SURGERY

## 2018-07-16 PROCEDURE — 99999 PR PBB SHADOW E&M-EST. PATIENT-LVL III: CPT | Mod: PBBFAC,,, | Performed by: NEUROLOGICAL SURGERY

## 2018-07-16 PROCEDURE — 72040 X-RAY EXAM NECK SPINE 2-3 VW: CPT | Mod: TC,FY

## 2018-07-16 PROCEDURE — 99024 POSTOP FOLLOW-UP VISIT: CPT | Mod: POP,,, | Performed by: NEUROLOGICAL SURGERY

## 2018-07-16 NOTE — PROGRESS NOTES
NEUROSURGICAL POST-OPERATIVE PROGRESS NOTE    DATE OF SERVICE:  07/16/2018      ATTENDING PHYSICIAN:  Oumar Guerrero MD    SUBJECTIVE:    INTERIM HISTORY:    This is a very pleasant 67 y.o. y.o. female, who is status almost one month C6-7, C7-T1 ACDF. Neck pain and UE pain has improved since surgery. She has been compliant with wearing her neck brace. Her voice sounds muffled. She has mild difficulty swallowing and needs to take smaller bites.                OBJECTIVE:    PHYSICAL EXAMINATION:   Vitals:    07/16/18 1536   BP: (!) 190/103   Pulse: 101       Neurosurgery Physical Exam    Ortho Exam    Neurologic Exam     Motor Exam     Strength   Strength 5/5 throughout.       Wound has healed.    DIAGNOSTIC DATA:    X-ray of the cervical spine, AP and lateral views reveals the fusion hardware is intact. No loosening of screws or migration of hardware.    ASSESMENT:    This is a 67 y.o. female who is s/p C6-7 and C7-T1 ACDF. Improvement in pain compared to before surgery    PLAN:  Continue to wear collar during the day  OK to drive  Follow-up in 4 weeks  Will order bone growth stimulator        Oumar Guerrero MD  Pager: [phone]

## 2018-07-24 ENCOUNTER — TELEPHONE (OUTPATIENT)
Dept: NEUROSURGERY | Facility: CLINIC | Age: 68
End: 2018-07-24

## 2018-07-24 ENCOUNTER — OFFICE VISIT (OUTPATIENT)
Dept: NEUROSURGERY | Facility: CLINIC | Age: 68
End: 2018-07-24
Payer: MEDICARE

## 2018-07-24 ENCOUNTER — HOSPITAL ENCOUNTER (OUTPATIENT)
Dept: RADIOLOGY | Facility: HOSPITAL | Age: 68
Discharge: HOME | End: 2018-07-24
Attending: NEUROLOGICAL SURGERY
Payer: MEDICARE

## 2018-07-24 VITALS
DIASTOLIC BLOOD PRESSURE: 98 MMHG | WEIGHT: 255.94 LBS | HEIGHT: 64 IN | HEART RATE: 110 BPM | SYSTOLIC BLOOD PRESSURE: 157 MMHG | BODY MASS INDEX: 43.69 KG/M2

## 2018-07-24 DIAGNOSIS — Z98.1 S/P CERVICAL SPINAL FUSION: Primary | ICD-10-CM

## 2018-07-24 DIAGNOSIS — Z98.1 ARTHRODESIS STATUS: ICD-10-CM

## 2018-07-24 PROCEDURE — 72040 X-RAY EXAM NECK SPINE 2-3 VW: CPT | Mod: 26,,, | Performed by: RADIOLOGY

## 2018-07-24 PROCEDURE — 99214 OFFICE O/P EST MOD 30 MIN: CPT | Mod: PBBFAC,25,PO | Performed by: NEUROLOGICAL SURGERY

## 2018-07-24 PROCEDURE — 99999 PR PBB SHADOW E&M-EST. PATIENT-LVL IV: CPT | Mod: PBBFAC,,, | Performed by: NEUROLOGICAL SURGERY

## 2018-07-24 PROCEDURE — 72040 X-RAY EXAM NECK SPINE 2-3 VW: CPT | Mod: TC,FY

## 2018-07-24 PROCEDURE — 99024 POSTOP FOLLOW-UP VISIT: CPT | Mod: POP,,, | Performed by: NEUROLOGICAL SURGERY

## 2018-07-24 NOTE — PROGRESS NOTES
NEUROSURGICAL POST-OPERATIVE PROGRESS NOTE    DATE OF SERVICE:  07/24/2018      ATTENDING PHYSICIAN:  Oumar Guerrero MD    SUBJECTIVE:    INTERIM HISTORY:    This is a very pleasant 67 y.o. y.o. female, who is status 6 weeks C6-7 and C7-T1 ACDF, removal of anterior plate. Complaint with wearing her neck brace and bone growth stimulator. Complains of myofascial neck pain. Has been doing home health PT. Improvement in Ue pain.                OBJECTIVE:    PHYSICAL EXAMINATION:   Vitals:    07/24/18 1548   BP: (!) 157/98   Pulse: 110       Neurosurgery Physical Exam    Ortho Exam    Neurologic Exam    Wound has healed.    DIAGNOSTIC DATA:    X-ray of the cervical spine, AP and lateral views reveals the fusion hardware is intact. No loosening of screws or migration of hardware.    ASSESMENT:    This is a 67 y.o. female who is s/p C6-7 and C7-T1 fusion for pseudoarthrosis. Myofascial neck pain    PLAN:    Deep neck flexor muscle strengthening exercises   FU in 6 weeks  OK to DC collar        Oumar Guerrero MD  Pager: [phone]

## 2018-09-07 DIAGNOSIS — B02.29 POSTHERPETIC NEURALGIA: ICD-10-CM

## 2018-09-08 RX ORDER — PREGABALIN 150 MG/1
CAPSULE ORAL
Qty: 120 CAPSULE | Refills: 0 | Status: SHIPPED | OUTPATIENT
Start: 2018-09-08 | End: 2018-12-15 | Stop reason: SDUPTHER

## 2018-10-27 RX ORDER — MELOXICAM 15 MG/1
TABLET ORAL
Qty: 30 TABLET | Refills: 2 | Status: SHIPPED | OUTPATIENT
Start: 2018-10-27 | End: 2018-12-17 | Stop reason: SDUPTHER

## 2018-10-29 RX ORDER — TEMAZEPAM 30 MG/1
CAPSULE ORAL
Qty: 30 CAPSULE | OUTPATIENT
Start: 2018-10-29

## 2018-10-30 DIAGNOSIS — G47.00 INSOMNIA, UNSPECIFIED TYPE: ICD-10-CM

## 2018-11-01 RX ORDER — DOXEPIN HYDROCHLORIDE 10 MG/1
CAPSULE ORAL
Qty: 180 CAPSULE | Refills: 0 | Status: SHIPPED | OUTPATIENT
Start: 2018-11-01 | End: 2019-02-08 | Stop reason: SDUPTHER

## 2018-11-20 ENCOUNTER — TELEPHONE (OUTPATIENT)
Dept: FAMILY MEDICINE | Facility: CLINIC | Age: 68
End: 2018-11-20

## 2018-11-20 NOTE — TELEPHONE ENCOUNTER
----- Message from Jacqueline Garcia sent at 11/20/2018  4:37 PM CST -----  Contact: [phone]/self  Pt states she is returning your call. Also confirmed your appt.

## 2018-11-21 ENCOUNTER — OFFICE VISIT (OUTPATIENT)
Dept: FAMILY MEDICINE | Facility: CLINIC | Age: 68
End: 2018-11-21
Payer: MEDICARE

## 2018-11-21 VITALS
BODY MASS INDEX: 43.62 KG/M2 | TEMPERATURE: 99 F | WEIGHT: 255.5 LBS | HEART RATE: 78 BPM | RESPIRATION RATE: 18 BRPM | SYSTOLIC BLOOD PRESSURE: 130 MMHG | OXYGEN SATURATION: 98 % | DIASTOLIC BLOOD PRESSURE: 86 MMHG | HEIGHT: 64 IN

## 2018-11-21 DIAGNOSIS — Z11.59 ENCOUNTER FOR HEPATITIS C SCREENING TEST FOR LOW RISK PATIENT: ICD-10-CM

## 2018-11-21 DIAGNOSIS — G89.4 CHRONIC PAIN SYNDROME: ICD-10-CM

## 2018-11-21 DIAGNOSIS — Z23 NEED FOR INFLUENZA VACCINATION: ICD-10-CM

## 2018-11-21 DIAGNOSIS — F32.A DEPRESSION, UNSPECIFIED DEPRESSION TYPE: ICD-10-CM

## 2018-11-21 DIAGNOSIS — Z23 NEED FOR PNEUMOCOCCAL VACCINATION: ICD-10-CM

## 2018-11-21 DIAGNOSIS — N39.0 RECURRENT UTI: ICD-10-CM

## 2018-11-21 DIAGNOSIS — R30.0 DYSURIA: Primary | ICD-10-CM

## 2018-11-21 DIAGNOSIS — I10 ESSENTIAL HYPERTENSION: ICD-10-CM

## 2018-11-21 DIAGNOSIS — E78.00 HYPERCHOLESTEREMIA: ICD-10-CM

## 2018-11-21 LAB
BILIRUB SERPL-MCNC: ABNORMAL MG/DL
BLOOD, POC UA: 50
GLUCOSE UR QL STRIP: ABNORMAL
KETONES UR QL STRIP: NEGATIVE
LEUKOCYTE ESTERASE URINE, POC: ABNORMAL
NITRITE, POC UA: ABNORMAL
PH, POC UA: 5
PROTEIN, POC: ABNORMAL
SPECIFIC GRAVITY, POC UA: 1.02
UROBILINOGEN, POC UA: NORMAL

## 2018-11-21 PROCEDURE — 87086 URINE CULTURE/COLONY COUNT: CPT

## 2018-11-21 PROCEDURE — 87088 URINE BACTERIA CULTURE: CPT

## 2018-11-21 PROCEDURE — 87077 CULTURE AEROBIC IDENTIFY: CPT

## 2018-11-21 PROCEDURE — 99999 PR PBB SHADOW E&M-EST. PATIENT-LVL III: CPT | Mod: PBBFAC,,, | Performed by: FAMILY MEDICINE

## 2018-11-21 PROCEDURE — 87186 SC STD MICRODIL/AGAR DIL: CPT

## 2018-11-21 PROCEDURE — 99213 OFFICE O/P EST LOW 20 MIN: CPT | Mod: PBBFAC,PN,25 | Performed by: FAMILY MEDICINE

## 2018-11-21 PROCEDURE — 99214 OFFICE O/P EST MOD 30 MIN: CPT | Mod: 25,S$PBB,, | Performed by: FAMILY MEDICINE

## 2018-11-21 PROCEDURE — 81000 URINALYSIS NONAUTO W/SCOPE: CPT | Mod: PBBFAC,PN | Performed by: FAMILY MEDICINE

## 2018-11-21 PROCEDURE — 90662 IIV NO PRSV INCREASED AG IM: CPT | Mod: PBBFAC,PN

## 2018-11-21 PROCEDURE — 90732 PPSV23 VACC 2 YRS+ SUBQ/IM: CPT | Mod: PBBFAC,PN

## 2018-11-21 RX ORDER — NITROFURANTOIN (MACROCRYSTALS) 100 MG/1
100 CAPSULE ORAL 4 TIMES DAILY
Qty: 30 CAPSULE | Refills: 11 | Status: SHIPPED | OUTPATIENT
Start: 2018-11-21 | End: 2019-02-22 | Stop reason: ALTCHOICE

## 2018-11-21 RX ORDER — DULOXETIN HYDROCHLORIDE 60 MG/1
60 CAPSULE, DELAYED RELEASE ORAL DAILY
Qty: 90 CAPSULE | Refills: 0 | Status: SHIPPED | OUTPATIENT
Start: 2018-11-21 | End: 2019-02-08 | Stop reason: SDUPTHER

## 2018-11-21 RX ORDER — CIPROFLOXACIN 500 MG/1
500 TABLET ORAL 2 TIMES DAILY
Qty: 20 TABLET | Refills: 0 | Status: SHIPPED | OUTPATIENT
Start: 2018-11-21 | End: 2018-12-01

## 2018-11-21 NOTE — PROGRESS NOTES
HPI:  Marie Cano is a 68 y.o. year old female that  presents with symptoms of pain with urination. She has history of chronic UTI.She also has cut down on her pain medication as not to get addicted.  She does find that she is still having a lot of pain.  Chief Complaint   Patient presents with    Urinary Tract Infection    Follow-up     pt would like to have handicap form filled out--hep C screening    Medication Refill     cymbalta    .     HPI      Past Medical History:   Diagnosis Date    Anxiety 5/7/2017    Arthritis 12/19/2015    Chronic pain syndrome 5/7/2017    Essential hypertension 5/7/2017    Hypercholesteremia 5/7/2017    Left knee pain 12/19/2015    SOB (shortness of breath) 12/19/2015    Spinal stenosis     Use of cane as ambulatory aid 5/7/2017    Vitamin D deficiency 5/7/2017     Social History     Socioeconomic History    Marital status:      Spouse name: Not on file    Number of children: Not on file    Years of education: Not on file    Highest education level: Not on file   Social Needs    Financial resource strain: Not on file    Food insecurity - worry: Not on file    Food insecurity - inability: Not on file    Transportation needs - medical: Not on file    Transportation needs - non-medical: Not on file   Occupational History    Not on file   Tobacco Use    Smoking status: Never Smoker    Smokeless tobacco: Never Used   Substance and Sexual Activity    Alcohol use: No    Drug use: No    Sexual activity: Not on file   Other Topics Concern    Not on file   Social History Narrative    Not on file     Past Surgical History:   Procedure Laterality Date    BREAST SURGERY      CYST REMOVAL      FRACTURE SURGERY      bone fragments removed, leg    HARDWARE REMOVAL- ANTERIOR CERVICAL Removal of Crevical plate and screws. Revision of C6-7 Arthodesis C7-T1 Anterior Disectomy foraminotomy, Arthodesis instrumentation N/A 6/14/2018    Performed by Oumar Guerrero,  "MD at [facility] OR    HYSTERECTOMY      REMOVAL OF HARDWARE FROM ANTERIOR CERVICAL SPINE N/A 6/14/2018    Procedure: HARDWARE REMOVAL- ANTERIOR CERVICAL Removal of Crevical plate and screws. Revision of C6-7 Arthodesis C7-T1 Anterior Disectomy foraminotomy, Arthodesis instrumentation;  Surgeon: Oumar Guerrero MD;  Location: [facility] OR;  Service: Neurosurgery;  Laterality: N/A;  HARDWARE REMOVAL- ANTERIOR CERVICAL  Removal of Crevical plate and screws. Revision of C6-7 Arthodesis  C7-T1 Anterior Disectom    TONSILLECTOMY      TOTAL REDUCTION MAMMOPLASTY       Family History   Problem Relation Age of Onset    Ulcers Mother     Heart attack Father     Breast cancer Sister     Diabetes Brother     Kidney failure Brother     No Known Problems Daughter     Post-traumatic stress disorder Son     Cirrhosis Brother     Hepatitis Brother         C    No Known Problems Maternal Aunt     No Known Problems Maternal Uncle     No Known Problems Paternal Aunt     No Known Problems Paternal Uncle     No Known Problems Maternal Grandmother     No Known Problems Maternal Grandfather     No Known Problems Paternal Grandmother     No Known Problems Paternal Grandfather     Amblyopia Neg Hx     Blindness Neg Hx     Cancer Neg Hx     Cataracts Neg Hx     Glaucoma Neg Hx     Hypertension Neg Hx     Macular degeneration Neg Hx     Retinal detachment Neg Hx     Strabismus Neg Hx     Stroke Neg Hx     Thyroid disease Neg Hx            Review of Systems  General ROS: negative for chills, fever or weight loss  ENT ROS: negative for epistaxis, sore throat or rhinorrhea  Respiratory ROS: no cough, shortness of breath, or wheezing  Cardiovascular ROS: no chest pain or dyspnea on exertion  Gastrointestinal ROS: no abdominal pain, change in bowel habits, or black/ bloody stools  Integument:  Dry scalp  Physical Exam:  /86   Pulse 78   Temp 98.5 °F (36.9 °C) (Oral)   Resp 18   Ht 5' 4" (1.626 m)   Wt 115.9 kg (255 " lb 8.2 oz)   SpO2 98%   BMI 43.86 kg/m²   General appearance: alert, cooperative, no distress  Constitutional:Oriented to person, place, and time.appears well-developed and well-nourished.  HEENT: Normocephalic, atraumatic, neck symmetrical, no nasal discharge, TM- clear bilaterally  Lungs: clear to auscultation bilaterally, no dullness to percussion bilaterally  Heart: regular rate and rhythm without rub; no displacement of the PMI , S1&S2 present  Abdomen: soft, non-tender; bowel sounds normoactive; no organomegaly  Integument: some areas of dry scaly that her mild throughout scalp.  Physical Exam    LABS:    Complete Blood Count  Lab Results   Component Value Date    RBC 4.72 05/17/2018    HGB 13.1 05/17/2018    HCT 42.4 05/17/2018    MCV 90 05/17/2018    MCH 27.8 05/17/2018    MCHC 30.9 (L) 05/17/2018    RDW 14.7 (H) 05/17/2018     (H) 05/17/2018    MPV 9.1 (L) 05/17/2018    GRAN 4.2 05/17/2018    GRAN 50.7 05/17/2018    LYMPH 3.1 05/17/2018    LYMPH 38.2 05/17/2018    MONO 0.7 05/17/2018    MONO 8.0 05/17/2018    EOS 0.2 05/17/2018    BASO 0.03 05/17/2018    EOSINOPHIL 2.6 05/17/2018    BASOPHIL 0.4 05/17/2018    DIFFMETHOD Automated 05/17/2018       Comprehensive Metabolic Panel  Lab Results   Component Value Date     05/17/2018    BUN 17 05/17/2018    CREATININE 0.9 05/17/2018     05/17/2018    K 4.5 05/17/2018     05/17/2018    PROT 7.5 05/17/2018    ALBUMIN 3.8 05/17/2018    BILITOT 0.2 05/17/2018    AST 12 05/17/2018    ALKPHOS 76 05/17/2018    CO2 29 05/17/2018    ALT 7 (L) 05/17/2018    ANIONGAP 8 05/17/2018    EGFRNONAA >60 05/17/2018    ESTGFRAFRICA >60 05/17/2018       LIPID  Lab Results   Component Value Date    CHOL 152 05/17/2018    HDL 52 05/17/2018         TSH  Lab Results   Component Value Date    TSH 3.799 05/17/2018       Current Outpatient Medications   Medication Sig Dispense Refill    baclofen (LIORESAL) 20 MG tablet TAKE 1/2-1 TABLET BY MOUTH THREE TIMES A DAY  AS NEEDED 90 tablet 2    celecoxib (CELEBREX) 200 MG capsule Take 1 capsule (200 mg total) by mouth 2 (two) times daily as needed for Pain. 60 capsule 0    doxepin (SINEQUAN) 10 MG capsule TAKE 1-2 CAPSULES BY MOUTH AT BEDTIME 180 capsule 0    DULoxetine (CYMBALTA) 60 MG capsule Take 1 capsule (60 mg total) by mouth once daily. 90 capsule 0    ergocalciferol (ERGOCALCIFEROL) 50,000 unit Cap TAKE 1 CAPSULE BY MOUTH TWICE A WEEK 16 capsule 0    irbesartan (AVAPRO) 300 MG tablet Take 1 tablet (300 mg total) by mouth every evening. 90 tablet 1    levocetirizine (XYZAL) 5 MG tablet Take 1 tablet (5 mg total) by mouth every evening. 30 tablet 2    lidocaine-prilocaine (EMLA) cream APPLY 1-2 GRAMS TO THE AFFECTED AREAS 3-4 TIMES DAILY STARTING 2 WEEKS POST SURGERY.  3    LYRICA 150 mg capsule TAKE 1 CAPSULE BY MOUTH FOUR TIMES A  capsule 0    meloxicam (MOBIC) 15 MG tablet Take 1 tablet by mouth once daily. 30 tablet 2    propranolol (INDERAL) 10 MG tablet Take 1 tablet (10 mg total) by mouth 2 (two) times daily. tremors 180 tablet 0    rosuvastatin (CRESTOR) 10 MG tablet Take 1 tablet (10 mg total) by mouth once daily. For cholesterol 90 tablet 3    SCAR CARE BASE ENHANCED Gel APPLY 1/2 GRAM (1 PUMP) TO AFFECTED AREAS TWICE DAILY.  3    sertraline (ZOLOFT) 50 MG tablet Take 2 tablets (100 mg total) by mouth once daily. 60 tablet 2    temazepam (RESTORIL) 30 mg capsule Take 1 capsule (30 mg total) by mouth nightly. 30 capsule 5    traMADol (ULTRAM) 50 mg tablet Take 1-2 tablets (50 mg - 100 mg total) by mouth every 6 (six) hours as needed for pain 60 tablet 0    VOLTAREN 1 % Gel APPLY 2.5 GRAMS TO THE AFFECTED AREA 3-4 TIMES DAILY STARTING 2 WEEKS POST SURGERY.  3    ciprofloxacin HCl (CIPRO) 500 MG tablet Take 1 tablet (500 mg total) by mouth 2 (two) times daily. for 10 days 20 tablet 0    nitrofurantoin (MACRODANTIN) 100 MG capsule Take 1 capsule (100 mg total) by mouth 4 (four) times daily. 30  capsule 11     No current facility-administered medications for this visit.        Assessment:    ICD-10-CM ICD-9-CM    1. Dysuria R30.0 788.1 POCT URINALYSIS      Urine culture   2. Depression, unspecified depression type F32.9 311 DULoxetine (CYMBALTA) 60 MG capsule   3. Essential hypertension I10 401.9 Hypertension Digital Medicine (HDMP) Enrollment Order   4. Hypercholesteremia E78.00 272.0    5. Chronic pain syndrome G89.4 338.4    6. Recurrent UTI N39.0 599.0 ciprofloxacin HCl (CIPRO) 500 MG tablet      nitrofurantoin (MACRODANTIN) 100 MG capsule   7. Encounter for hepatitis C screening test for low risk patient Z11.59 V73.89 Hepatitis C antibody   8. Need for influenza vaccination Z23 V04.81 Influenza - High Dose (65+) (PF) (IM)   9. Need for pneumococcal vaccination Z23 V03.82 (In Office Administered) Pneumococcal Polysaccharide Vaccine (23 Valent) (SQ/IM)         Plan:  UA:  Positive nitrites,  positive leukocytes, positive blood  Will treat with Cipro for current urinary tract infection and then place patient on prophylactic Macrodantin for current.  Follow-up in 3 months (on 2/19/2019).          Vanna Rios MD

## 2018-11-24 LAB — BACTERIA UR CULT: NORMAL

## 2018-12-08 RX ORDER — TRAMADOL HYDROCHLORIDE 50 MG/1
TABLET ORAL
Qty: 270 TABLET | Refills: 0 | Status: SHIPPED | OUTPATIENT
Start: 2018-12-08

## 2018-12-11 ENCOUNTER — LAB VISIT (OUTPATIENT)
Dept: LAB | Facility: HOSPITAL | Age: 68
End: 2018-12-11
Attending: FAMILY MEDICINE
Payer: MEDICARE

## 2018-12-11 ENCOUNTER — TELEPHONE (OUTPATIENT)
Dept: FAMILY MEDICINE | Facility: CLINIC | Age: 68
End: 2018-12-11

## 2018-12-11 DIAGNOSIS — Z11.59 ENCOUNTER FOR HEPATITIS C SCREENING TEST FOR LOW RISK PATIENT: ICD-10-CM

## 2018-12-11 LAB — HCV AB SERPL QL IA: NEGATIVE

## 2018-12-11 PROCEDURE — 36415 COLL VENOUS BLD VENIPUNCTURE: CPT

## 2018-12-11 PROCEDURE — 86803 HEPATITIS C AB TEST: CPT

## 2018-12-11 NOTE — TELEPHONE ENCOUNTER
Patient states she needs her antibiotic throughout the year because of chronic UTIs. Patient states she was told by her pharmacist she can only fill 90 pills in one year of her Macrodantin. For recent refill they only dispensed her 16 tablets. Patient would like to have a different prescription called in that she can fill throughout the year. Please advise.

## 2018-12-14 ENCOUNTER — TELEPHONE (OUTPATIENT)
Dept: FAMILY MEDICINE | Facility: CLINIC | Age: 68
End: 2018-12-14

## 2018-12-14 NOTE — TELEPHONE ENCOUNTER
----- Message from Marcel Orellana sent at 12/14/2018 11:18 AM CST -----  Contact: [phone]  Patient requested to speak with the nurse again and advised she have a few more questions. Please call.

## 2018-12-15 DIAGNOSIS — B02.29 POSTHERPETIC NEURALGIA: ICD-10-CM

## 2018-12-17 RX ORDER — MELOXICAM 15 MG/1
TABLET ORAL
Qty: 30 TABLET | Refills: 2 | Status: SHIPPED | OUTPATIENT
Start: 2018-12-17 | End: 2019-04-10 | Stop reason: SDUPTHER

## 2018-12-18 RX ORDER — PREGABALIN 150 MG/1
CAPSULE ORAL
Qty: 120 CAPSULE | Refills: 0 | Status: SHIPPED | OUTPATIENT
Start: 2018-12-18 | End: 2019-01-25 | Stop reason: SDUPTHER

## 2019-01-02 RX ORDER — ERGOCALCIFEROL 1.25 MG/1
CAPSULE ORAL
Qty: 16 CAPSULE | Refills: 0 | Status: SHIPPED | OUTPATIENT
Start: 2019-01-02 | End: 2019-02-08 | Stop reason: SDUPTHER

## 2019-01-02 RX ORDER — TEMAZEPAM 30 MG/1
CAPSULE ORAL
Qty: 30 CAPSULE | Refills: 2 | Status: SHIPPED | OUTPATIENT
Start: 2019-01-02 | End: 2019-02-19 | Stop reason: SDUPTHER

## 2019-01-25 DIAGNOSIS — B02.29 POSTHERPETIC NEURALGIA: ICD-10-CM

## 2019-01-25 RX ORDER — PREGABALIN 150 MG/1
CAPSULE ORAL
Qty: 120 CAPSULE | Refills: 0 | Status: SHIPPED | OUTPATIENT
Start: 2019-01-25 | End: 2019-02-08 | Stop reason: SDUPTHER

## 2019-02-08 DIAGNOSIS — F43.10 PTSD (POST-TRAUMATIC STRESS DISORDER): ICD-10-CM

## 2019-02-08 DIAGNOSIS — G47.00 INSOMNIA, UNSPECIFIED TYPE: ICD-10-CM

## 2019-02-08 DIAGNOSIS — B02.29 POSTHERPETIC NEURALGIA: ICD-10-CM

## 2019-02-08 DIAGNOSIS — F32.A DEPRESSION, UNSPECIFIED DEPRESSION TYPE: ICD-10-CM

## 2019-02-08 NOTE — TELEPHONE ENCOUNTER
----- Message from Amira Pedro sent at 2/8/2019 11:15 AM CST -----  Contact: [phone]  Pharmacy would like to receive an authorization on a refill for sertraline (ZOLOFT) 50 MG tablet. Please advise.

## 2019-02-11 RX ORDER — PREGABALIN 150 MG/1
CAPSULE ORAL
Qty: 120 CAPSULE | Refills: 0 | Status: SHIPPED | OUTPATIENT
Start: 2019-02-11

## 2019-02-11 RX ORDER — DULOXETIN HYDROCHLORIDE 60 MG/1
CAPSULE, DELAYED RELEASE ORAL
Qty: 90 CAPSULE | Refills: 0 | Status: SHIPPED | OUTPATIENT
Start: 2019-02-11 | End: 2019-05-09 | Stop reason: SDUPTHER

## 2019-02-11 RX ORDER — SERTRALINE HYDROCHLORIDE 50 MG/1
100 TABLET, FILM COATED ORAL DAILY
Qty: 60 TABLET | Refills: 2 | Status: SHIPPED | OUTPATIENT
Start: 2019-02-11 | End: 2019-05-16 | Stop reason: SDUPTHER

## 2019-02-11 RX ORDER — ERGOCALCIFEROL 1.25 MG/1
CAPSULE ORAL
Qty: 16 CAPSULE | Refills: 0 | Status: SHIPPED | OUTPATIENT
Start: 2019-02-11 | End: 2019-04-05 | Stop reason: SDUPTHER

## 2019-02-11 RX ORDER — DOXEPIN HYDROCHLORIDE 10 MG/1
CAPSULE ORAL
Qty: 180 CAPSULE | Refills: 0 | Status: SHIPPED | OUTPATIENT
Start: 2019-02-11 | End: 2019-02-19 | Stop reason: SDUPTHER

## 2019-02-11 RX ORDER — PROPRANOLOL HYDROCHLORIDE 10 MG/1
TABLET ORAL
Qty: 180 TABLET | Refills: 0 | Status: SHIPPED | OUTPATIENT
Start: 2019-02-11 | End: 2019-05-09 | Stop reason: SDUPTHER

## 2019-02-19 ENCOUNTER — OFFICE VISIT (OUTPATIENT)
Dept: FAMILY MEDICINE | Facility: CLINIC | Age: 69
End: 2019-02-19
Payer: MEDICARE

## 2019-02-19 VITALS
RESPIRATION RATE: 18 BRPM | WEIGHT: 238 LBS | SYSTOLIC BLOOD PRESSURE: 148 MMHG | TEMPERATURE: 98 F | HEART RATE: 95 BPM | OXYGEN SATURATION: 95 % | HEIGHT: 64 IN | BODY MASS INDEX: 40.63 KG/M2 | DIASTOLIC BLOOD PRESSURE: 88 MMHG

## 2019-02-19 DIAGNOSIS — N39.0 RECURRENT UTI: ICD-10-CM

## 2019-02-19 DIAGNOSIS — N39.0 CHRONIC UTI: ICD-10-CM

## 2019-02-19 DIAGNOSIS — L21.9 SEBORRHEIC DERMATITIS OF SCALP: Primary | ICD-10-CM

## 2019-02-19 DIAGNOSIS — M48.03 SPINAL STENOSIS OF CERVICOTHORACIC REGION: ICD-10-CM

## 2019-02-19 DIAGNOSIS — G47.00 INSOMNIA, UNSPECIFIED TYPE: ICD-10-CM

## 2019-02-19 DIAGNOSIS — R25.2 SPASM: ICD-10-CM

## 2019-02-19 PROCEDURE — 99214 PR OFFICE/OUTPT VISIT, EST, LEVL IV, 30-39 MIN: ICD-10-PCS | Mod: S$PBB,,, | Performed by: FAMILY MEDICINE

## 2019-02-19 PROCEDURE — 99215 OFFICE O/P EST HI 40 MIN: CPT | Mod: PBBFAC,PN | Performed by: FAMILY MEDICINE

## 2019-02-19 PROCEDURE — 99214 OFFICE O/P EST MOD 30 MIN: CPT | Mod: S$PBB,,, | Performed by: FAMILY MEDICINE

## 2019-02-19 PROCEDURE — 99999 PR PBB SHADOW E&M-EST. PATIENT-LVL V: CPT | Mod: PBBFAC,,, | Performed by: FAMILY MEDICINE

## 2019-02-19 PROCEDURE — 99999 PR PBB SHADOW E&M-EST. PATIENT-LVL V: ICD-10-PCS | Mod: PBBFAC,,, | Performed by: FAMILY MEDICINE

## 2019-02-19 RX ORDER — NITROFURANTOIN (MACROCRYSTALS) 100 MG/1
100 CAPSULE ORAL 4 TIMES DAILY
Qty: 30 CAPSULE | Refills: 11 | Status: CANCELLED | OUTPATIENT
Start: 2019-02-19

## 2019-02-19 RX ORDER — DOXEPIN HYDROCHLORIDE 10 MG/1
CAPSULE ORAL
Qty: 180 CAPSULE | Refills: 0 | Status: SHIPPED | OUTPATIENT
Start: 2019-02-19 | End: 2019-05-16 | Stop reason: SDUPTHER

## 2019-02-19 RX ORDER — BACLOFEN 20 MG/1
TABLET ORAL
Qty: 90 TABLET | Refills: 2 | Status: SHIPPED | OUTPATIENT
Start: 2019-02-19 | End: 2019-05-16 | Stop reason: SDUPTHER

## 2019-02-19 RX ORDER — NITROFURANTOIN (MACROCRYSTALS) 100 MG/1
100 CAPSULE ORAL NIGHTLY
Qty: 30 CAPSULE | Refills: 11 | Status: SHIPPED | OUTPATIENT
Start: 2019-02-19

## 2019-02-19 RX ORDER — TEMAZEPAM 30 MG/1
30 CAPSULE ORAL NIGHTLY
Qty: 30 CAPSULE | Refills: 2 | Status: SHIPPED | OUTPATIENT
Start: 2019-02-19 | End: 2019-03-29 | Stop reason: SDUPTHER

## 2019-02-19 NOTE — PROGRESS NOTES
HPI:  Marie Cano is a 68 y.o. year old female that  presents for follow-up.  She has recently had the flu and lost weight secondary to lack of appetite.  She is having difficulty getting her prophylactic Macrodantin prescription secondary to the signature being incorrect.  Chief Complaint   Patient presents with    Follow-up     pt has lost about 20lbs--pt states she has had the flu 2 times--pt states since neck surgery she has been having complications with headaches and dizziness    Medication Problem     Macrodantin--pt states she takes it about 4 times a day, insurance company will only fill 90 a year(pt would like to have a different prescription or appeal done)   .           Past Medical History:   Diagnosis Date    Anxiety 5/7/2017    Arthritis 12/19/2015    Chronic pain syndrome 5/7/2017    Essential hypertension 5/7/2017    Hypercholesteremia 5/7/2017    Left knee pain 12/19/2015    SOB (shortness of breath) 12/19/2015    Spinal stenosis     Use of cane as ambulatory aid 5/7/2017    Vitamin D deficiency 5/7/2017     Social History     Socioeconomic History    Marital status:      Spouse name: Not on file    Number of children: Not on file    Years of education: Not on file    Highest education level: Not on file   Social Needs    Financial resource strain: Not on file    Food insecurity - worry: Not on file    Food insecurity - inability: Not on file    Transportation needs - medical: Not on file    Transportation needs - non-medical: Not on file   Occupational History    Not on file   Tobacco Use    Smoking status: Never Smoker    Smokeless tobacco: Never Used   Substance and Sexual Activity    Alcohol use: No    Drug use: No    Sexual activity: Not on file   Other Topics Concern    Not on file   Social History Narrative    Not on file     Past Surgical History:   Procedure Laterality Date    BREAST SURGERY      CYST REMOVAL      FRACTURE SURGERY      bone  "fragments removed, leg    HARDWARE REMOVAL- ANTERIOR CERVICAL Removal of Crevical plate and screws. Revision of C6-7 Arthodesis C7-T1 Anterior Disectomy foraminotomy, Arthodesis instrumentation N/A 6/14/2018    Performed by [facility] Gu[facility]ro MD at [facility] OR    HYSTERECTOMY      TONSILLECTOMY      TOTAL REDUCTION MAMMOPLASTY       Family History   Problem Relation Age of Onset    Ulcers Mother     Heart attack Father     Breast cancer Sister     Diabetes Brother     Kidney failure Brother     No Known Problems Daughter     Post-traumatic stress disorder Son     Cirrhosis Brother     Hepatitis Brother         C    No Known Problems Maternal Aunt     No Known Problems Maternal Uncle     No Known Problems Paternal Aunt     No Known Problems Paternal Uncle     No Known Problems Maternal Grandmother     No Known Problems Maternal Grandfather     No Known Problems Paternal Grandmother     No Known Problems Paternal Grandfather     Amblyopia Neg Hx     Blindness Neg Hx     Cancer Neg Hx     Cataracts Neg Hx     Glaucoma Neg Hx     Hypertension Neg Hx     Macular degeneration Neg Hx     Retinal detachment Neg Hx     Strabismus Neg Hx     Stroke Neg Hx     Thyroid disease Neg Hx            Review of Systems  General ROS: negative for chills, fever or weight loss  ENT ROS: negative for epistaxis, sore throat or rhinorrhea  Respiratory ROS: no cough, shortness of breath, or wheezing  Cardiovascular ROS: no chest pain or dyspnea on exertion  Gastrointestinal ROS: no abdominal pain, change in bowel habits, or black/ bloody stools  Skin:  Dry flaky scalp patches in the posterior of scalp and top of scalp  Physical Exam:  BP (!) 148/88   Pulse 95   Temp 98.3 °F (36.8 °C) (Oral)   Resp 18   Ht 5' 4" (1.626 m)   Wt 108 kg (238 lb)   SpO2 95%   BMI 40.85 kg/m²   General appearance: alert, cooperative, no distress  Constitutional:Oriented to person, place, and time.appears well-developed and " well-nourished.  HEENT: Normocephalic, atraumatic, neck symmetrical, no nasal discharge, TM- clear bilaterally  Lungs: clear to auscultation bilaterally, no dullness to percussion bilaterally  Heart: regular rate and rhythm without rub; no displacement of the PMI , S1&S2 present  Abdomen: soft, non-tender; bowel sounds normoactive; no organomegaly  Skin:  Maculopapular scaly on posterior of left-sided a scalp and superior central scalp varying in size  Physical Exam      LABS:    Complete Blood Count  Lab Results   Component Value Date    RBC 4.72 05/17/2018    HGB 13.1 05/17/2018    HCT 42.4 05/17/2018    MCV 90 05/17/2018    MCH 27.8 05/17/2018    MCHC 30.9 (L) 05/17/2018    RDW 14.7 (H) 05/17/2018     (H) 05/17/2018    MPV 9.1 (L) 05/17/2018    GRAN 4.2 05/17/2018    GRAN 50.7 05/17/2018    LYMPH 3.1 05/17/2018    LYMPH 38.2 05/17/2018    MONO 0.7 05/17/2018    MONO 8.0 05/17/2018    EOS 0.2 05/17/2018    BASO 0.03 05/17/2018    EOSINOPHIL 2.6 05/17/2018    BASOPHIL 0.4 05/17/2018    DIFFMETHOD Automated 05/17/2018       Comprehensive Metabolic Panel  Lab Results   Component Value Date     05/17/2018    BUN 17 05/17/2018    CREATININE 0.9 05/17/2018     05/17/2018    K 4.5 05/17/2018     05/17/2018    PROT 7.5 05/17/2018    ALBUMIN 3.8 05/17/2018    BILITOT 0.2 05/17/2018    AST 12 05/17/2018    ALKPHOS 76 05/17/2018    CO2 29 05/17/2018    ALT 7 (L) 05/17/2018    ANIONGAP 8 05/17/2018    EGFRNONAA >60 05/17/2018    ESTGFRAFRICA >60 05/17/2018       LIPID  Lab Results   Component Value Date    CHOL 152 05/17/2018    HDL 52 05/17/2018         TSH  Lab Results   Component Value Date    TSH 3.799 05/17/2018       Current Outpatient Medications   Medication Sig Dispense Refill    celecoxib (CELEBREX) 200 MG capsule Take 1 capsule (200 mg total) by mouth 2 (two) times daily as needed for Pain. 60 capsule 0    DULoxetine (CYMBALTA) 60 MG capsule TAKE 1 CAPSULE BY MOUTH DAILY 90 capsule 0     ergocalciferol (VITAMIN D2) 50,000 unit Cap TAKE 1 CAPSULE BY MOUTH TWICE A WEEK 16 capsule 0    irbesartan (AVAPRO) 300 MG tablet Take 1 tablet (300 mg total) by mouth every evening. 90 tablet 1    levocetirizine (XYZAL) 5 MG tablet Take 1 tablet (5 mg total) by mouth every evening. 30 tablet 2    LYRICA 150 mg capsule TAKE 1 CAPSULE BY MOUTH FOUR TIMES A  capsule 0    meloxicam (MOBIC) 15 MG tablet Take 1 tablet by mouth once daily. 30 tablet 2    propranolol (INDERAL) 10 MG tablet TAKE 1 TABLET BY MOUTH 2 TIMES DAILY FOR TREMORS 180 tablet 0    rosuvastatin (CRESTOR) 10 MG tablet Take 1 tablet (10 mg total) by mouth once daily. For cholesterol 90 tablet 3    sertraline (ZOLOFT) 50 MG tablet Take 2 tablets (100 mg total) by mouth once daily. 60 tablet 2    traMADol (ULTRAM) 50 mg tablet TAKE 1 TABLET BY MOUTH THREE TIMES A DAY AS NEEDED 270 tablet 0    baclofen (LIORESAL) 20 MG tablet TAKE 1/2-1 TABLET BY MOUTH THREE TIMES A DAY AS NEEDED 90 tablet 2    doxepin (SINEQUAN) 10 MG capsule TAKE 1-2 CAPSULES BY MOUTH AT BEDTIME 180 capsule 0    nitrofurantoin (MACRODANTIN) 100 MG capsule Take 1 capsule (100 mg total) by mouth nightly. 30 capsule 11    temazepam (RESTORIL) 30 mg capsule Take 1 capsule (30 mg total) by mouth nightly. 30 capsule 2     No current facility-administered medications for this visit.        Assessment:    ICD-10-CM ICD-9-CM    1. Seborrheic dermatitis of scalp L21.9 690.18 Ambulatory Referral to Dermatology   2. Spasm R25.2 781.0 baclofen (LIORESAL) 20 MG tablet   3. Insomnia, unspecified type G47.00 780.52 doxepin (SINEQUAN) 10 MG capsule   4. Recurrent UTI N39.0 599.0    5. Chronic UTI N39.0 599.0 nitrofurantoin (MACRODANTIN) 100 MG capsule   6. Spinal stenosis of cervicothoracic region M48.03 723.0 Ambulatory Referral to Neurosurgery         Plan:  Will reorder Macrodantin 100 mg p.o. nightly  Follow-up in 4 weeks (on 3/21/2019).          Vanna Rios MD

## 2019-02-20 ENCOUNTER — TELEPHONE (OUTPATIENT)
Dept: FAMILY MEDICINE | Facility: CLINIC | Age: 69
End: 2019-02-20

## 2019-02-25 DIAGNOSIS — B02.29 POSTHERPETIC NEURALGIA: ICD-10-CM

## 2019-02-25 RX ORDER — PREGABALIN 150 MG/1
CAPSULE ORAL
Qty: 120 CAPSULE | Refills: 0 | Status: SHIPPED | OUTPATIENT
Start: 2019-02-25

## 2019-03-13 ENCOUNTER — TELEPHONE (OUTPATIENT)
Dept: NEUROSURGERY | Facility: CLINIC | Age: 69
End: 2019-03-13

## 2019-03-29 DIAGNOSIS — B02.29 POSTHERPETIC NEURALGIA: ICD-10-CM

## 2019-03-29 DIAGNOSIS — N39.0 CHRONIC UTI: ICD-10-CM

## 2019-03-29 RX ORDER — PREGABALIN 150 MG/1
CAPSULE ORAL
Qty: 120 CAPSULE | Refills: 0 | OUTPATIENT
Start: 2019-03-29

## 2019-03-29 RX ORDER — NITROFURANTOIN (MACROCRYSTALS) 100 MG/1
100 CAPSULE ORAL NIGHTLY
Qty: 30 CAPSULE | Refills: 11 | OUTPATIENT
Start: 2019-03-29

## 2019-03-29 RX ORDER — TEMAZEPAM 30 MG/1
30 CAPSULE ORAL NIGHTLY
Qty: 30 CAPSULE | Refills: 0 | Status: SHIPPED | OUTPATIENT
Start: 2019-03-29

## 2019-03-29 RX ORDER — TRAMADOL HYDROCHLORIDE 50 MG/1
TABLET ORAL
Qty: 270 TABLET | Refills: 0 | OUTPATIENT
Start: 2019-03-29

## 2019-03-29 NOTE — TELEPHONE ENCOUNTER
----- Message from Saima Will sent at 3/29/2019  8:45 AM CDT -----  No. [phone]   Patient needs Nitrofurantoin 100mg, Temazepam 30mg, Tramadol 50mg, and Lyrica 150mg.    [facility] in [place]

## 2019-04-05 DIAGNOSIS — Z12.11 COLON CANCER SCREENING: ICD-10-CM

## 2019-04-05 RX ORDER — ERGOCALCIFEROL 1.25 MG/1
CAPSULE ORAL
Qty: 16 CAPSULE | Refills: 0 | Status: SHIPPED | OUTPATIENT
Start: 2019-04-05

## 2019-04-10 RX ORDER — MELOXICAM 15 MG/1
TABLET ORAL
Qty: 30 TABLET | Refills: 2 | Status: SHIPPED | OUTPATIENT
Start: 2019-04-10

## 2019-05-09 DIAGNOSIS — F32.A DEPRESSION, UNSPECIFIED DEPRESSION TYPE: ICD-10-CM

## 2019-05-09 RX ORDER — PROPRANOLOL HYDROCHLORIDE 10 MG/1
TABLET ORAL
Qty: 180 TABLET | Refills: 0 | Status: SHIPPED | OUTPATIENT
Start: 2019-05-09

## 2019-05-09 RX ORDER — DULOXETIN HYDROCHLORIDE 60 MG/1
CAPSULE, DELAYED RELEASE ORAL
Qty: 90 CAPSULE | Refills: 0 | Status: SHIPPED | OUTPATIENT
Start: 2019-05-09

## 2019-05-16 DIAGNOSIS — F43.10 PTSD (POST-TRAUMATIC STRESS DISORDER): ICD-10-CM

## 2019-05-16 DIAGNOSIS — G47.00 INSOMNIA, UNSPECIFIED TYPE: ICD-10-CM

## 2019-05-16 DIAGNOSIS — R25.2 SPASM: ICD-10-CM

## 2019-05-16 RX ORDER — DOXEPIN HYDROCHLORIDE 10 MG/1
CAPSULE ORAL
Qty: 180 CAPSULE | Refills: 0 | Status: SHIPPED | OUTPATIENT
Start: 2019-05-16

## 2019-05-16 RX ORDER — SERTRALINE HYDROCHLORIDE 50 MG/1
TABLET, FILM COATED ORAL
Qty: 60 TABLET | Refills: 2 | Status: SHIPPED | OUTPATIENT
Start: 2019-05-16

## 2019-05-17 RX ORDER — BACLOFEN 20 MG/1
TABLET ORAL
Qty: 90 TABLET | Refills: 0 | Status: SHIPPED | OUTPATIENT
Start: 2019-05-17

## 2020-09-29 ENCOUNTER — PATIENT MESSAGE (OUTPATIENT)
Dept: OTHER | Facility: OTHER | Age: 70
End: 2020-09-29

## (undated) DEVICE — SEE MEDLINE ITEM 157116

## (undated) DEVICE — SEE MEDLINE ITEM 156905

## (undated) DEVICE — SUT MCRYL PLUS 4-0 PS2 27IN

## (undated) DEVICE — DRESSING AQUACEL SACRAL 9 X 9

## (undated) DEVICE — DRESSING TELFA N ADH 3X8

## (undated) DEVICE — GAUZE SPONGE PEANUT STRL

## (undated) DEVICE — GLOVE PROTEXIS HYDROGEL SZ6

## (undated) DEVICE — SHEET THYROID W/ISO-BAC

## (undated) DEVICE — CLOSURE SKIN STERI STRIP 1/2X4

## (undated) DEVICE — DRESSING TEGADERM 4.4X5IN

## (undated) DEVICE — APPLIER LIGACLIP SM 9.38IN

## (undated) DEVICE — APPLIER LIGACLIP LG 13IN

## (undated) DEVICE — SUT CTD VICRYL 3-0 CR/SH

## (undated) DEVICE — BLADE ELECTRO EDGE INSULATED

## (undated) DEVICE — DRESSING SURGICAL 1/2X1/2

## (undated) DEVICE — Device

## (undated) DEVICE — DRAPE OPMI STERILE

## (undated) DEVICE — SEE MEDLINE ITEM 146292

## (undated) DEVICE — SEE MEDLINE ITEM 157125

## (undated) DEVICE — NDL HYPO REG 25G X 1 1/2

## (undated) DEVICE — SKINMARKER W/RULER DEVON

## (undated) DEVICE — DRAPE C-ARM/MOBILE XRAY 44X80

## (undated) DEVICE — CORD BIPOLAR 12 FOOT

## (undated) DEVICE — SLEEVE AWL COALITION

## (undated) DEVICE — BURR MIS CURVED 3.0MM

## (undated) DEVICE — SUT VICRYL PLUS 3-0 SH 18IN

## (undated) DEVICE — GLOVE 7.5 PROTEXIS PI BLUE

## (undated) DEVICE — COVER OVERHEAD SURG LT BLUE

## (undated) DEVICE — APPLICATOR CHLORAPREP ORN 26ML

## (undated) DEVICE — MANIFOLD 4 PORT

## (undated) DEVICE — SEE MEDLINE ITEM 146313

## (undated) DEVICE — GAUZE SPONGE 4X4 12PLY

## (undated) DEVICE — GLOVE PROTEXIS HYDROGEL SZ7

## (undated) DEVICE — ADHESIVE MASTISOL VIAL 48/BX

## (undated) DEVICE — GLOVE 6.0 PROTEXIS PI BLUE

## (undated) DEVICE — SYR 30CC LUER LOCK

## (undated) DEVICE — ELECTRODE REM PLYHSV RETURN 9

## (undated) DEVICE — DRAPE STERI-DRAPE 1000 17X11IN

## (undated) DEVICE — SEE MEDLINE ITEM 157150

## (undated) DEVICE — TAPE SILK 3IN

## (undated) DEVICE — SEE MEDLINE ITEM 157117

## (undated) DEVICE — CLIP LIGATION MEDIUM CLIPS 1

## (undated) DEVICE — TOWEL OR NONABSORB ADH 17X26